# Patient Record
Sex: MALE | Race: OTHER | Employment: UNEMPLOYED | URBAN - METROPOLITAN AREA
[De-identification: names, ages, dates, MRNs, and addresses within clinical notes are randomized per-mention and may not be internally consistent; named-entity substitution may affect disease eponyms.]

---
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
## 2019-01-21 ENCOUNTER — APPOINTMENT (OUTPATIENT)
Dept: CT IMAGING | Facility: HOSPITAL | Age: 19
End: 2019-01-21
Attending: EMERGENCY MEDICINE
Payer: COMMERCIAL

## 2019-01-21 ENCOUNTER — HOSPITAL ENCOUNTER (EMERGENCY)
Facility: HOSPITAL | Age: 19
Discharge: HOME | End: 2019-01-21
Attending: EMERGENCY MEDICINE
Payer: COMMERCIAL

## 2019-01-21 VITALS
HEART RATE: 62 BPM | DIASTOLIC BLOOD PRESSURE: 62 MMHG | HEIGHT: 72 IN | TEMPERATURE: 97 F | BODY MASS INDEX: 37.52 KG/M2 | OXYGEN SATURATION: 98 % | RESPIRATION RATE: 16 BRPM | SYSTOLIC BLOOD PRESSURE: 96 MMHG | WEIGHT: 277 LBS

## 2019-01-21 DIAGNOSIS — G43.909 MIGRAINE WITHOUT STATUS MIGRAINOSUS, NOT INTRACTABLE, UNSPECIFIED MIGRAINE TYPE: Primary | ICD-10-CM

## 2019-01-21 LAB
ANION GAP SERPL CALC-SCNC: 6 MMOL/L (ref 0–18)
BASOPHILS # BLD AUTO: 0.07 X10(3) UL (ref 0–0.1)
BASOPHILS NFR BLD AUTO: 0.7 %
BUN BLD-MCNC: 13 MG/DL (ref 8–20)
BUN/CREAT SERPL: 12.4 (ref 10–20)
CALCIUM BLD-MCNC: 9.1 MG/DL (ref 8.3–10.3)
CHLORIDE SERPL-SCNC: 110 MMOL/L (ref 101–111)
CO2 SERPL-SCNC: 25 MMOL/L (ref 22–32)
CREAT BLD-MCNC: 1.05 MG/DL (ref 0.7–1.3)
EOSINOPHIL # BLD AUTO: 0.28 X10(3) UL (ref 0–0.3)
EOSINOPHIL NFR BLD AUTO: 2.8 %
ERYTHROCYTE [DISTWIDTH] IN BLOOD BY AUTOMATED COUNT: 14.1 % (ref 11.5–16)
GLUCOSE BLD-MCNC: 71 MG/DL (ref 70–99)
HCT VFR BLD AUTO: 43 % (ref 37–53)
HGB BLD-MCNC: 13.9 G/DL (ref 13–17)
IMMATURE GRANULOCYTE COUNT: 0.03 X10(3) UL (ref 0–1)
IMMATURE GRANULOCYTE RATIO %: 0.3 %
LYMPHOCYTES # BLD AUTO: 3.3 X10(3) UL (ref 0.9–4)
LYMPHOCYTES NFR BLD AUTO: 32.5 %
MCH RBC QN AUTO: 27.5 PG (ref 27–33.2)
MCHC RBC AUTO-ENTMCNC: 32.3 G/DL (ref 31–37)
MCV RBC AUTO: 85.1 FL (ref 80–99)
MONOCYTES # BLD AUTO: 0.66 X10(3) UL (ref 0.1–1)
MONOCYTES NFR BLD AUTO: 6.5 %
NEUTROPHIL ABS PRELIM: 5.81 X10 (3) UL (ref 1.3–6.7)
NEUTROPHILS # BLD AUTO: 5.81 X10(3) UL (ref 1.3–6.7)
NEUTROPHILS NFR BLD AUTO: 57.2 %
OSMOLALITY SERPL CALC.SUM OF ELEC: 291 MOSM/KG (ref 275–295)
PLATELET # BLD AUTO: 324 10(3)UL (ref 150–450)
POTASSIUM SERPL-SCNC: 3.9 MMOL/L (ref 3.6–5.1)
RBC # BLD AUTO: 5.05 X10(6)UL (ref 4.3–5.7)
RED CELL DISTRIBUTION WIDTH-SD: 43.6 FL (ref 35.1–46.3)
SODIUM SERPL-SCNC: 141 MMOL/L (ref 136–144)
WBC # BLD AUTO: 10.2 X10(3) UL (ref 4–13)

## 2019-01-21 PROCEDURE — 99284 EMERGENCY DEPT VISIT MOD MDM: CPT

## 2019-01-21 PROCEDURE — 80048 BASIC METABOLIC PNL TOTAL CA: CPT | Performed by: EMERGENCY MEDICINE

## 2019-01-21 PROCEDURE — 70450 CT HEAD/BRAIN W/O DYE: CPT | Performed by: EMERGENCY MEDICINE

## 2019-01-21 PROCEDURE — 85025 COMPLETE CBC W/AUTO DIFF WBC: CPT | Performed by: EMERGENCY MEDICINE

## 2019-01-21 RX ORDER — SUMATRIPTAN 50 MG/1
50 TABLET, FILM COATED ORAL EVERY 2 HOUR PRN
Qty: 6 TABLET | Refills: 0 | Status: SHIPPED | OUTPATIENT
Start: 2019-01-21 | End: 2019-02-20

## 2019-01-21 RX ORDER — DIPHENHYDRAMINE HYDROCHLORIDE 50 MG/ML
25 INJECTION INTRAMUSCULAR; INTRAVENOUS ONCE
Status: COMPLETED | OUTPATIENT
Start: 2019-01-21 | End: 2019-01-21

## 2019-01-21 RX ORDER — METOCLOPRAMIDE HYDROCHLORIDE 5 MG/ML
10 INJECTION INTRAMUSCULAR; INTRAVENOUS ONCE
Status: COMPLETED | OUTPATIENT
Start: 2019-01-21 | End: 2019-01-21

## 2019-01-21 RX ORDER — KETOROLAC TROMETHAMINE 30 MG/ML
30 INJECTION, SOLUTION INTRAMUSCULAR; INTRAVENOUS ONCE
Status: COMPLETED | OUTPATIENT
Start: 2019-01-21 | End: 2019-01-21

## 2019-01-21 NOTE — ED PROVIDER NOTES
Patient Seen in: [facility] Emergency Department    History   Patient presents with:  Headache (neurologic)    Stated Complaint: Migraine for days, no relief from meds    HPI    Should not presents with a migraine.   The patient states that he has no HPI.  Constitutional and vital signs reviewed. All other systems reviewed and negative except as noted above.     Physical Exam     ED Triage Vitals [01/21/19 1336]   /68   Pulse 80   Resp 14   Temp 97.3 °F (36.3 °C)   Temp src Temporal   SpO2 10 ([address]) which includes the Dose Index Registry. PATIENT STATED HISTORY: (As transcribed by Technologist)  migraine for days, with no relief from meds.  nausea and photophobia    FINDINGS:  VENTRICLES/SULCI:  Ventricles and sulci medications    SUMAtriptan Succinate 50 MG Oral Tab  Take 1 tablet (50 mg total) by mouth every 2 (two) hours as needed for Migraine. No more than 4 tablets/24 hours.   Qty: 6 tablet Refills: 0

## 2019-02-18 ENCOUNTER — APPOINTMENT (OUTPATIENT)
Dept: GENERAL RADIOLOGY | Facility: HOSPITAL | Age: 19
End: 2019-02-18
Attending: PEDIATRICS
Payer: OTHER MISCELLANEOUS

## 2019-02-18 ENCOUNTER — HOSPITAL ENCOUNTER (EMERGENCY)
Facility: HOSPITAL | Age: 19
Discharge: HOME | End: 2019-02-18
Attending: PEDIATRICS
Payer: OTHER MISCELLANEOUS

## 2019-02-18 VITALS
WEIGHT: 288.81 LBS | HEART RATE: 88 BPM | SYSTOLIC BLOOD PRESSURE: 115 MMHG | RESPIRATION RATE: 20 BRPM | BODY MASS INDEX: 39 KG/M2 | OXYGEN SATURATION: 97 % | TEMPERATURE: 98 F | DIASTOLIC BLOOD PRESSURE: 75 MMHG

## 2019-02-18 DIAGNOSIS — S43.402A SPRAIN OF LEFT SHOULDER, UNSPECIFIED SHOULDER SPRAIN TYPE, INITIAL ENCOUNTER: Primary | ICD-10-CM

## 2019-02-18 PROCEDURE — 99284 EMERGENCY DEPT VISIT MOD MDM: CPT | Performed by: PEDIATRICS

## 2019-02-18 PROCEDURE — 73000 X-RAY EXAM OF COLLAR BONE: CPT | Performed by: PEDIATRICS

## 2019-02-18 PROCEDURE — 73030 X-RAY EXAM OF SHOULDER: CPT | Performed by: PEDIATRICS

## 2019-02-18 RX ORDER — IBUPROFEN 600 MG/1
600 TABLET ORAL ONCE
Status: COMPLETED | OUTPATIENT
Start: 2019-02-18 | End: 2019-02-18

## 2019-02-18 NOTE — ED INITIAL ASSESSMENT (HPI)
Pt here for left shoulder pain  Per pt, \"I have lax joints and this morning I was at work at [employer] and I fell on the ice and landed on my left shoulder. I heard a popping noise. Now, it feels like it's not quite in place. \"  Pt report

## 2019-02-18 NOTE — ED PROVIDER NOTES
Patient Seen in: [facility] Emergency Department    History   Patient presents with:  Upper Extremity Injury (musculoskeletal)    Stated Complaint: fall /shoulder inj    HPI    20-year-old male who slipped while at work and landed on his left should oropharyngeal exudate. Eyes: Conjunctivae and EOM are normal. Pupils are equal, round, and reactive to light. Right eye exhibits no discharge. Left eye exhibits no discharge. No scleral icterus. Neck: Normal range of motion. Neck supple.  No JVD present BP: 115/75   Pulse: 88   Resp: 20   Temp: 98 °F (36.7 °C)   TempSrc: Temporal   SpO2: 97%   Weight: 131 kg           MDM   ASSESSMENT & PLAN:    22year old male with left shoulder sprain, x-rays negative for fracture. Sling given.     I have considered

## 2019-02-18 NOTE — ED NOTES
Spoke with Occupational Health regarding work injury. Pt with no paperwork brought with him. [facility] is not on list for drug or additional testing.

## 2019-02-19 ENCOUNTER — APPOINTMENT (OUTPATIENT)
Dept: OTHER | Facility: HOSPITAL | Age: 19
End: 2019-02-19
Attending: PREVENTIVE MEDICINE
Payer: OTHER MISCELLANEOUS

## 2019-02-22 ENCOUNTER — APPOINTMENT (OUTPATIENT)
Dept: OTHER | Facility: HOSPITAL | Age: 19
End: 2019-02-22
Attending: PREVENTIVE MEDICINE
Payer: OTHER MISCELLANEOUS

## 2019-03-01 ENCOUNTER — APPOINTMENT (OUTPATIENT)
Dept: OTHER | Facility: HOSPITAL | Age: 19
End: 2019-03-01
Attending: PREVENTIVE MEDICINE
Payer: OTHER MISCELLANEOUS

## 2019-03-01 ENCOUNTER — HOSPITAL ENCOUNTER (OUTPATIENT)
Dept: MRI IMAGING | Age: 19
Discharge: HOME | End: 2019-03-01
Attending: PREVENTIVE MEDICINE
Payer: OTHER MISCELLANEOUS

## 2019-03-01 DIAGNOSIS — M25.512 LEFT SHOULDER PAIN, UNSPECIFIED CHRONICITY: ICD-10-CM

## 2019-03-01 PROCEDURE — 73221 MRI JOINT UPR EXTREM W/O DYE: CPT | Performed by: PREVENTIVE MEDICINE

## 2019-03-06 PROBLEM — S43.422A SPRAIN OF LEFT ROTATOR CUFF CAPSULE, INITIAL ENCOUNTER: Status: ACTIVE | Noted: 2019-03-06

## 2019-05-06 ENCOUNTER — HOSPITAL ENCOUNTER (INPATIENT)
Facility: HOSPITAL | Age: 19
LOS: 3 days | Discharge: HOME | DRG: 603 | End: 2019-05-09
Attending: EMERGENCY MEDICINE | Admitting: HOSPITALIST
Payer: COMMERCIAL

## 2019-05-06 DIAGNOSIS — E87.6 HYPOKALEMIA: ICD-10-CM

## 2019-05-06 DIAGNOSIS — L03.311 ABDOMINAL WALL CELLULITIS: Primary | ICD-10-CM

## 2019-05-06 PROCEDURE — 99223 1ST HOSP IP/OBS HIGH 75: CPT | Performed by: HOSPITALIST

## 2019-05-06 RX ORDER — CEFAZOLIN SODIUM/WATER 2 G/20 ML
2 SYRINGE (ML) INTRAVENOUS ONCE
Status: COMPLETED | OUTPATIENT
Start: 2019-05-06 | End: 2019-05-06

## 2019-05-06 RX ORDER — KETOROLAC TROMETHAMINE 30 MG/ML
30 INJECTION, SOLUTION INTRAMUSCULAR; INTRAVENOUS EVERY 6 HOURS PRN
Status: DISPENSED | OUTPATIENT
Start: 2019-05-06 | End: 2019-05-08

## 2019-05-06 RX ORDER — CLONAZEPAM 0.5 MG/1
1 TABLET ORAL 2 TIMES DAILY PRN
Status: DISCONTINUED | OUTPATIENT
Start: 2019-05-06 | End: 2019-05-09

## 2019-05-06 RX ORDER — ACETAMINOPHEN 500 MG
1000 TABLET ORAL ONCE
Status: COMPLETED | OUTPATIENT
Start: 2019-05-06 | End: 2019-05-06

## 2019-05-06 RX ORDER — CEFAZOLIN SODIUM/WATER 2 G/20 ML
2 SYRINGE (ML) INTRAVENOUS EVERY 6 HOURS
Status: DISCONTINUED | OUTPATIENT
Start: 2019-05-06 | End: 2019-05-09

## 2019-05-06 RX ORDER — IPRATROPIUM BROMIDE AND ALBUTEROL SULFATE 2.5; .5 MG/3ML; MG/3ML
3 SOLUTION RESPIRATORY (INHALATION) EVERY 4 HOURS PRN
Status: DISCONTINUED | OUTPATIENT
Start: 2019-05-06 | End: 2019-05-09

## 2019-05-06 RX ORDER — SUMATRIPTAN 50 MG/1
50 TABLET, FILM COATED ORAL EVERY 2 HOUR PRN
Status: DISCONTINUED | OUTPATIENT
Start: 2019-05-06 | End: 2019-05-09

## 2019-05-06 RX ORDER — DEXTROAMPHETAMINE SACCHARATE, AMPHETAMINE ASPARTATE, DEXTROAMPHETAMINE SULFATE AND AMPHETAMINE SULFATE 2.5; 2.5; 2.5; 2.5 MG/1; MG/1; MG/1; MG/1
10 TABLET ORAL
Status: DISCONTINUED | OUTPATIENT
Start: 2019-05-07 | End: 2019-05-09

## 2019-05-06 RX ORDER — SUMATRIPTAN 50 MG/1
50 TABLET, FILM COATED ORAL EVERY 2 HOUR PRN
COMMUNITY
End: 2021-09-19

## 2019-05-06 RX ORDER — LAMOTRIGINE 25 MG/1
75 TABLET ORAL 2 TIMES DAILY
Status: DISCONTINUED | OUTPATIENT
Start: 2019-05-06 | End: 2019-05-09

## 2019-05-06 RX ORDER — IBUPROFEN 600 MG/1
600 TABLET ORAL EVERY 4 HOURS PRN
Status: DISCONTINUED | OUTPATIENT
Start: 2019-05-06 | End: 2019-05-09

## 2019-05-06 RX ORDER — QUETIAPINE 25 MG/1
75 TABLET, FILM COATED ORAL NIGHTLY
COMMUNITY
End: 2021-09-22 | Stop reason: DRUGHIGH

## 2019-05-06 RX ORDER — POTASSIUM CHLORIDE 20 MEQ/1
40 TABLET, EXTENDED RELEASE ORAL ONCE
Status: COMPLETED | OUTPATIENT
Start: 2019-05-06 | End: 2019-05-06

## 2019-05-06 RX ORDER — ONDANSETRON 2 MG/ML
4 INJECTION INTRAMUSCULAR; INTRAVENOUS EVERY 6 HOURS PRN
Status: DISCONTINUED | OUTPATIENT
Start: 2019-05-06 | End: 2019-05-09

## 2019-05-06 RX ORDER — DULOXETIN HYDROCHLORIDE 60 MG/1
120 CAPSULE, DELAYED RELEASE ORAL DAILY
Status: DISCONTINUED | OUTPATIENT
Start: 2019-05-07 | End: 2019-05-09

## 2019-05-06 RX ORDER — IBUPROFEN 400 MG/1
400 TABLET ORAL EVERY 4 HOURS PRN
Status: DISCONTINUED | OUTPATIENT
Start: 2019-05-06 | End: 2019-05-09

## 2019-05-06 RX ORDER — BUPROPION HYDROCHLORIDE 150 MG/1
450 TABLET ORAL DAILY
Status: DISCONTINUED | OUTPATIENT
Start: 2019-05-07 | End: 2019-05-09

## 2019-05-06 RX ORDER — TESTOSTERONE ENANTHATE 200 MG/ML
200 INJECTION, SOLUTION INTRAMUSCULAR WEEKLY
Refills: 1 | COMMUNITY
Start: 2019-03-07 | End: 2021-09-22

## 2019-05-06 RX ORDER — METOCLOPRAMIDE HYDROCHLORIDE 5 MG/ML
10 INJECTION INTRAMUSCULAR; INTRAVENOUS EVERY 8 HOURS PRN
Status: DISCONTINUED | OUTPATIENT
Start: 2019-05-06 | End: 2019-05-09

## 2019-05-06 RX ORDER — IBUPROFEN 200 MG
200 TABLET ORAL EVERY 4 HOURS PRN
Status: DISCONTINUED | OUTPATIENT
Start: 2019-05-06 | End: 2019-05-09

## 2019-05-06 RX ORDER — GABAPENTIN 300 MG/1
300 CAPSULE ORAL NIGHTLY
Status: DISCONTINUED | OUTPATIENT
Start: 2019-05-06 | End: 2019-05-09

## 2019-05-06 RX ORDER — ACETAMINOPHEN 325 MG/1
650 TABLET ORAL EVERY 6 HOURS PRN
Status: DISCONTINUED | OUTPATIENT
Start: 2019-05-06 | End: 2019-05-09

## 2019-05-06 RX ORDER — TOPIRAMATE 25 MG/1
50 TABLET ORAL 2 TIMES DAILY
Status: DISCONTINUED | OUTPATIENT
Start: 2019-05-06 | End: 2019-05-09

## 2019-05-06 NOTE — ED PROVIDER NOTES
Patient Seen in: [facility] Emergency Department    History   Patient presents with:  Fever (infectious)  Cellulitis (integumentary, infectious)    Stated Complaint: fever, bodyaches, redness to abdomen    HPI    CHIEF COMPLAINT: Fevers, abdominal r above.     Past Medical History:   Diagnosis Date   • ADD (attention deficit disorder)    • ALLERGIC RHINITIS    • Anxiety    • ASTHMA    • Asthma    • Blastomycosis    • Cancer [facility])    • Chronic rhinitis    • Depression    • Extrinsic asthma, unspecified abdominal distention. Neurological:  Grossly intact, no deficits. 5 out of 5 symmetric upper and lower extremity motor strength. Gait normal.  Skin:  warm and dry, no rashes. No jaundice.  Brisk capillary refill  Musculoskeletal: neck is supple, no lympha results with the patient. I discussed the diagnosis and admission for further evaluation and care. Patient states they understand diagnosis and admission and agree with admission and plan. I answered all of the patient's questions prior to admission.   Admi

## 2019-05-06 NOTE — PROGRESS NOTES
NURSING ADMISSION NOTE      Patient admitted via Cart accompanied per MOM  tmep--100.9;rashes noted entire abdomen;alert and oriented x 4;IV saline loc to right ac  Oriented to room. Safety precautions initiated. Bed in low position.   Call light in r

## 2019-05-07 PROCEDURE — 99232 SBSQ HOSP IP/OBS MODERATE 35: CPT | Performed by: HOSPITALIST

## 2019-05-07 NOTE — PROGRESS NOTES
[facility]  Progress Note     Gerard Villarreal Patient Status:  Inpatient    2000 MRN [id number]   [facility] 4NW-A Attending Warren Christensen MD   Hosp Day # 1 PCP Warden Richardson     Chief Complaint: abdominal pain    S: Imaging data reviewed in Epic.     Medications:   • buPROPion HCl ER (XL)  450 mg Oral Daily   • DULoxetine HCl  120 mg Oral Daily   • gabapentin  300 mg Oral Nightly   • lamoTRIgine  75 mg Oral BID   • amphetamine-dextroamphetamine  10 mg Oral BID AC   • F

## 2019-05-07 NOTE — PAYOR COMM NOTE
--------------  ADMISSION REVIEW     Payor: Nataliia Space #:  [id number]  Authorization Number: [id number]    Admit date: 5/6/19  Admit time: 2728       Admitting Physician: Lachelle Garcia MD  Attending Physician:  Lachelle Garcia MD  Kasaan Pole movement changes. Patient's appetite has been decreased for the past 24 hours patient denies any recent surgeries. João Lo       REVIEW OF SYSTEMS:  Eyes: no discharge  ENT: no sore throat  Cardiovascular: no chest pain, no palpitations  Respiratory: no cough, no SpO2 98%   BMI 37.57 kg/m²          Physical Exam    Vital signs and nursing notes reviewed   General Appearance: Patient is alert and oriented x4 in no acute distress  Eyes: pupils equal and round no pallor or injection, no sclera icterus  Respiratory: ------                     CBC W/ DIFFERENTIAL[[id number]]          Abnormal            Final result                 Please view results for these tests on the individual orders.    URINALYSIS WITH CULTURE REFLEX   BLOOD CULTURE   BLOOD CULTURE   patient IV diagnosis)  Hypokalemia. I agree with the plan as noted.               Signed by Soraida Armijo MD on 5/6/2019 10:26 PM            H&P - H&P Note      H&P signed by Paddy Lang MD at 5/6/2019  4:55 PM     Author:  Paddy Lang MD Service:  Cristiano Bajwa Past Surgical History: History reviewed. No pertinent surgical history. Social History:  reports that he has never smoked. He has never used smokeless tobacco. He reports that he does not drink alcohol or use drugs.     Family History:   Family Histo ) Disp: 30 capsule Rfl: 0   Lisdexamfetamine Dimesylate 50 MG Oral Cap Take 1 capsule (50 mg total) by mouth every morning.  Disp: 15 capsule Rfl: 0   ClonazePAM (KLONOPIN) 0.5 MG Oral Tab Take one tab BID (Patient taking differently: Take 1.5 mg by mouth 2 Lab 05/06/19  1450   GLU 92   BUN 11   CREATSERUM 1.09   GFRAA 112   GFRNAA 97   CA 9.7   ALB 3.9      K 3.3*      CO2 23.0   ALKPHO 126*   AST 17   ALT 26   BILT 0.6   TP 8.3*       Estimated Creatinine Clearance: 117.7 mL/min (based on SCr Intravenous Arnoldo Ayon RN    5/6/2019 2107 Given 2 g Intravenous Arnoldo Ayon RN      DULoxetine HCl (CYMBALTA) DR particles cap 120 mg     Date Action Dose Route User    5/7/2019 5360 Given 120 mg Oral Ermalene CAROL ANN Alfredo      gabapentin (Susanne Mjt 119/71  HEENT: Moist mucous membranes. Extraocular muscles are intact. Neck: No lymphadenopathy. supple, no masses  Respiratory: Clear to auscultation bilaterally. No wheezes. No rhonchi. Cardiovascular: S1, S2.  Regular rate and rhythm. No murmurs.   Ab

## 2019-05-07 NOTE — PLAN OF CARE
Patient received this am alert and oriented, resting in bed, mom at bedside. Lungs clear on room air, abdomen soft, bowel sounds present, denies any issues with urinating. Complaints of pain this am and IV toradol administered per MAR.  Saline lock flushed

## 2019-05-07 NOTE — PLAN OF CARE
Pt alert and oriented x4. VSS, afebrile. Consult to ID called, will see pt in morning. Denies pain, c/o generalized discomfort to abd area. Area of abdomen with cellulitis marked per orders. Abdomen red and warm to touch. IV abx given per MAR.  Denies nause

## 2019-05-07 NOTE — CONSULTS
INFECTIOUS DISEASE CONSULTATION    Naomi Sales Patient Status:  Inpatient    2000 MRN [id number]   [facility] 4NW-A Attending Nelida White MD   [facility] Day # 1 PCP Kalina Viera Oral, Daily  •  clonazePAM (KLONOPIN) tab 1 mg, 1 mg, Oral, BID PRN  •  DULoxetine HCl (CYMBALTA) DR particles cap 120 mg, 120 mg, Oral, Daily  •  gabapentin (NEURONTIN) cap 300 mg, 300 mg, Oral, Nightly  •  lamoTRIgine (LAMICTAL) tab 75 mg, 75 mg, Oral, B daily. Disp: 90 tablet Rfl: 0   topiramate 50 MG Oral Tab Take 1 tablet (50 mg total) by mouth 2 (two) times daily. Disp: 30 tablet Rfl: 0   BuPROPion HCl ER, SR, 150 MG Oral Tablet 12 Hr Take 1 tablet (150 mg total) by mouth 2 (two) times daily.  (Patient and rhythm. No murmurs. Abdomen: Soft, nontender, nondistended. Positive bowel sounds. Musculoskeletal: Full range of motion of all extremities. No swelling noted.   Joints: no effusions  Skin: Bilateral mastectomy scars healed  Prominent abdominal str

## 2019-05-08 PROBLEM — F32.A DEPRESSION: Chronic | Status: ACTIVE | Noted: 2019-05-08

## 2019-05-08 PROCEDURE — 99232 SBSQ HOSP IP/OBS MODERATE 35: CPT | Performed by: INTERNAL MEDICINE

## 2019-05-08 RX ORDER — POTASSIUM CHLORIDE 20 MEQ/1
40 TABLET, EXTENDED RELEASE ORAL EVERY 4 HOURS
Status: DISPENSED | OUTPATIENT
Start: 2019-05-08 | End: 2019-05-08

## 2019-05-08 NOTE — PROGRESS NOTES
[facility]                INFECTIOUS DISEASE PROGRESS NOTE    Will Flores Patient Status:  Inpatient    2000 MRN [id number]   [facility] 4NW-A Attending Mary Camacho MD   [facility] Day # 2 PCP Liang Birch     Anti Generalized anxiety disorder     Suicide risk     ADHD (attention deficit hyperactivity disorder), inattentive type     Sprain of calcaneofibular (ligament) of ankle     Chondromalacia of patella     Pain in joint, lower leg     Anxiety disorder     Anaphy

## 2019-05-08 NOTE — PLAN OF CARE
Pt Aox4 with complaints of mild pain to left thoracic region and left axilla. Redness extending beyond previously marked areas of redness. Dr. Le Estimable notified and came to examine pt at [phone].  New area marked and RN instructed to check hourly for the next

## 2019-05-08 NOTE — PLAN OF CARE
Patient received this am alert and oriented, resting in bed, lungs clear, abdomen soft, bowel sounds present, passing flatus. Voiding adequate amounts of urine, asymptomatic. Saline lock flushed and patent.   Problem: PAIN - ADULT  Goal: Verbalizes/displays

## 2019-05-08 NOTE — PROGRESS NOTES
[facility]  Progress Note     Will Flores Patient Status:  Inpatient    2000 MRN [id number]   [facility] 4NW-A Attending Shree Gleason MD   Hosp Day # 2 PCP Liang Birch     Chief Complaint: abdominal pain    S: 13.2   MCV 84.9 86.4 87.6   .0 219.0 225.0       Recent Labs   Lab 05/06/19  1450 05/08/19  0549   GLU 92 115*   BUN 11 12   CREATSERUM 1.09 1.21   GFRAA 112 98   GFRNAA 97 85   CA 9.7 9.0   ALB 3.9  --     142   K 3.3* 3.6    110   CO2

## 2019-05-09 VITALS
HEART RATE: 67 BPM | RESPIRATION RATE: 18 BRPM | HEIGHT: 72 IN | DIASTOLIC BLOOD PRESSURE: 69 MMHG | SYSTOLIC BLOOD PRESSURE: 119 MMHG | WEIGHT: 277 LBS | TEMPERATURE: 98 F | BODY MASS INDEX: 37.52 KG/M2 | OXYGEN SATURATION: 94 %

## 2019-05-09 PROCEDURE — 99239 HOSP IP/OBS DSCHRG MGMT >30: CPT | Performed by: INTERNAL MEDICINE

## 2019-05-09 RX ORDER — PENICILLIN V POTASSIUM 500 MG/1
TABLET ORAL
Qty: 120 TABLET | Refills: 1 | Status: SHIPPED | OUTPATIENT
Start: 2019-05-09 | End: 2019-07-18

## 2019-05-09 NOTE — PROGRESS NOTES
[facility]                INFECTIOUS DISEASE PROGRESS NOTE    Vijaya Foster Patient Status:  Inpatient    2000 MRN [id number]   [facility] 4NW-A Attending Caitlyn Clemente MD   [facility] Day # 3 KIRSTEN Roldan hyperactivity disorder), inattentive type     Sprain of calcaneofibular (ligament) of ankle     Chondromalacia of patella     Pain in joint, lower leg     Anxiety disorder     Anaphylactic reaction     Allergic reaction     Anaphylaxis     Migraine     Int

## 2019-05-09 NOTE — PLAN OF CARE
Pt Aox4 with no complaints of pain. Pt states that pain is only present with pressure to the affected area. Redness on abd has decreased in the past 24 hours. Redness and swelling in left axilla is the same. Pt given meds per MAR.  Pt IV site wrapped and co

## 2019-05-09 NOTE — DISCHARGE SUMMARY
[facility]  Discharge Summary    Tereza Jones Patient Status:  Inpatient    2000 MRN [id number]   [facility] 4NW-A Attending Nuvia Juarez MD   [facility] Day # 3 KIRSTEN Blake     Date of Admission: 2019    Date of TCM follow-up    Lace+ Score: 63  59-90 High Risk  29-58 Medium Risk  0-28   Low Risk. TCM Follow-Up Recommendation:Mendel Hermosillo   LACE > 58:  High Risk of readmission after discharge from the hospital.      PCP: Yulissa Nunn Take 1 capsule (60 mg total) by mouth 2 (two) times daily.    Quantity:  30 capsule  Refills:  0        CONTINUE taking these medications      Instructions Prescription details   ASMANEX 30 METERED DOSES 110 MCG/INH Aepb  Generic drug:  Mometasone Furoate Windy Naqvi MD  [address] [place] [address]  [phone]      ID, As needed    Appointments for Next 30 Days 5/9/2019 - 6/8/2019    None            Physical Exam:  /69 (BP Location: Right arm)   Pulse 67   Temp 98.4 °F (36.9 °C)

## 2019-05-09 NOTE — PROGRESS NOTES
[facility]  Progress Note     Yudi Mahre Patient Status:  Inpatient    2000 MRN [id number]   [facility] 4NW-A Attending Susan Winchester MD   Hosp Day # 3 PCP Boy Stratton     Chief Complaint: abdominal pain    S: 246.0 219.0 225.0       Recent Labs   Lab 05/06/19  1450 05/08/19  0549   GLU 92 115*   BUN 11 12   CREATSERUM 1.09 1.21   GFRAA 112 98   GFRNAA 97 85   CA 9.7 9.0   ALB 3.9  --     142   K 3.3* 3.6    110   CO2 23.0 22.0   ALKPHO 126*  --    A

## 2019-05-09 NOTE — PROGRESS NOTES
Patient discharged to home explained all medications and follow up appt. Also explained to  medication at pharmacy. Patient verbalized his understanding of teaching.

## 2019-05-10 NOTE — PAYOR COMM NOTE
--------------  ADMISSION REVIEW     Payor: Nile Bellecal #:  [id number]  Authorization Number: [id number]    Admit date: 5/6/19  Admit time: 9257       Admitting Physician: Maggy Ramsay MD  Attending Physician:  No att. providers found movement changes. Patient's appetite has been decreased for the past 24 hours patient denies any recent surgeries. Gabriela Band       REVIEW OF SYSTEMS:  Eyes: no discharge  ENT: no sore throat  Cardiovascular: no chest pain, no palpitations  Respiratory: no cough, no SpO2 98%   BMI 37.57 kg/m²          Physical Exam    Vital signs and nursing notes reviewed   General Appearance: Patient is alert and oriented x4 in no acute distress  Eyes: pupils equal and round no pallor or injection, no sclera icterus  Respiratory: ------                     CBC W/ DIFFERENTIAL[[id number]]          Abnormal            Final result                 Please view results for these tests on the individual orders.    URINALYSIS WITH CULTURE REFLEX   BLOOD CULTURE   BLOOD CULTURE   patient IV diagnosis)  Hypokalemia. I agree with the plan as noted.               Signed by Royce Smith MD on 5/6/2019 10:26 PM            H&P - H&P Note      H&P signed by Merna Palmer MD at 5/6/2019  4:55 PM     Author:  Merna Palmer MD Service:  Ed Melton Past Surgical History: History reviewed. No pertinent surgical history. Social History:  reports that he has never smoked. He has never used smokeless tobacco. He reports that he does not drink alcohol or use drugs.     Family History:   Family Histo ) Disp: 30 capsule Rfl: 0   Lisdexamfetamine Dimesylate 50 MG Oral Cap Take 1 capsule (50 mg total) by mouth every morning.  Disp: 15 capsule Rfl: 0   ClonazePAM (KLONOPIN) 0.5 MG Oral Tab Take one tab BID (Patient taking differently: Take 1.5 mg by mouth 2 Lab 05/06/19  1450   GLU 92   BUN 11   CREATSERUM 1.09   GFRAA 112   GFRNAA 97   CA 9.7   ALB 3.9      K 3.3*      CO2 23.0   ALKPHO 126*   AST 17   ALT 26   BILT 0.6   TP 8.3*       Estimated Creatinine Clearance: 117.7 mL/min (based on SCr

## 2020-11-21 ENCOUNTER — HOSPITAL ENCOUNTER (EMERGENCY)
Facility: HOSPITAL | Age: 20
Discharge: HOME | End: 2020-11-21
Attending: EMERGENCY MEDICINE
Payer: OTHER MISCELLANEOUS

## 2020-11-21 VITALS
OXYGEN SATURATION: 97 % | HEART RATE: 81 BPM | TEMPERATURE: 98 F | DIASTOLIC BLOOD PRESSURE: 78 MMHG | WEIGHT: 315 LBS | RESPIRATION RATE: 16 BRPM | BODY MASS INDEX: 43 KG/M2 | SYSTOLIC BLOOD PRESSURE: 124 MMHG

## 2020-11-21 DIAGNOSIS — T07.XXXA MULTIPLE ABRASIONS: ICD-10-CM

## 2020-11-21 DIAGNOSIS — S61.552A DOG BITE OF LEFT WRIST, INITIAL ENCOUNTER: Primary | ICD-10-CM
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
## 2020-11-21 PROCEDURE — 99283 EMERGENCY DEPT VISIT LOW MDM: CPT

## 2020-11-21 PROCEDURE — 90471 IMMUNIZATION ADMIN: CPT

## 2020-11-21 RX ORDER — AMOXICILLIN AND CLAVULANATE POTASSIUM 875; 125 MG/1; MG/1
1 TABLET, FILM COATED ORAL 2 TIMES DAILY
Qty: 20 TABLET | Refills: 0 | Status: SHIPPED | OUTPATIENT
Start: 2020-11-21 | End: 2020-12-01

## 2020-11-21 NOTE — ED PROVIDER NOTES
Patient Seen in: [facility] Emergency Department      History   Patient presents with:  Bite    Stated Complaint: DOG BITE    HPI    23 yr old M here with dog bite to left wrist. 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  He s normal.      Mouth/Throat:      Mouth: Mucous membranes are moist.   Eyes:      Extraocular Movements: Extraocular movements intact. Conjunctiva/sclera: Conjunctivae normal.      Pupils: Pupils are equal, round, and reactive to light.    Neck:      Mus abrasions    Disposition:  Discharge  11/21/2020  8:19 am    Follow-up:  Christine Anderson 95  [facility] 93  [address]  [phone]  Call in 2 day(s)  Work related injury follow up          Medications Presc

## 2020-11-21 NOTE — ED INITIAL ASSESSMENT (HPI)
Pt presents to ed from work where a dog bite his left hand, small laceration noted to left hand with bleeding controlled

## 2021-09-15 ENCOUNTER — APPOINTMENT (OUTPATIENT)
Dept: GENERAL RADIOLOGY | Facility: HOSPITAL | Age: 21
DRG: 605 | End: 2021-09-15
Attending: EMERGENCY MEDICINE
Payer: COMMERCIAL

## 2021-09-15 ENCOUNTER — HOSPITAL ENCOUNTER (INPATIENT)
Facility: HOSPITAL | Age: 21
LOS: 2 days | Discharge: HOME | DRG: 605 | End: 2021-09-19
Attending: EMERGENCY MEDICINE | Admitting: HOSPITALIST
Payer: COMMERCIAL

## 2021-09-15 DIAGNOSIS — S21.101A: Primary | ICD-10-CM

## 2021-09-15 DIAGNOSIS — F42.4 EXCORIATION (SKIN-PICKING) DISORDER: ICD-10-CM

## 2021-09-15 DIAGNOSIS — L03.313 CELLULITIS OF CHEST WALL: ICD-10-CM

## 2021-09-15 LAB
ALBUMIN SERPL-MCNC: 3.5 G/DL (ref 3.4–5)
ALBUMIN/GLOB SERPL: 0.7 {RATIO} (ref 1–2)
ALP LIVER SERPL-CCNC: 128 U/L
ALT SERPL-CCNC: 19 U/L
ANION GAP SERPL CALC-SCNC: 8 MMOL/L (ref 0–18)
AST SERPL-CCNC: 13 U/L (ref 15–37)
BASOPHILS # BLD AUTO: 0.07 X10(3) UL (ref 0–0.2)
BASOPHILS NFR BLD AUTO: 0.6 %
BILIRUB SERPL-MCNC: 0.4 MG/DL (ref 0.1–2)
BUN BLD-MCNC: 10 MG/DL (ref 7–18)
CALCIUM BLD-MCNC: 8.9 MG/DL (ref 8.5–10.1)
CHLORIDE SERPL-SCNC: 112 MMOL/L (ref 98–112)
CO2 SERPL-SCNC: 20 MMOL/L (ref 21–32)
CREAT BLD-MCNC: 1.1 MG/DL
EOSINOPHIL # BLD AUTO: 0.46 X10(3) UL (ref 0–0.7)
EOSINOPHIL NFR BLD AUTO: 4.2 %
ERYTHROCYTE [DISTWIDTH] IN BLOOD BY AUTOMATED COUNT: 12.7 %
GLOBULIN PLAS-MCNC: 4.7 G/DL (ref 2.8–4.4)
GLUCOSE BLD-MCNC: 116 MG/DL (ref 70–99)
HCT VFR BLD AUTO: 43.4 %
HGB BLD-MCNC: 14.5 G/DL
IMM GRANULOCYTES # BLD AUTO: 0.04 X10(3) UL (ref 0–1)
IMM GRANULOCYTES NFR BLD: 0.4 %
LACTATE SERPL-SCNC: 1.3 MMOL/L (ref 0.4–2)
LYMPHOCYTES # BLD AUTO: 2.53 X10(3) UL (ref 1–4)
LYMPHOCYTES NFR BLD AUTO: 23.3 %
MCH RBC QN AUTO: 29.5 PG (ref 26–34)
MCHC RBC AUTO-ENTMCNC: 33.4 G/DL (ref 31–37)
MCV RBC AUTO: 88.4 FL
MONOCYTES # BLD AUTO: 0.78 X10(3) UL (ref 0.1–1)
MONOCYTES NFR BLD AUTO: 7.2 %
NEUTROPHILS # BLD AUTO: 6.97 X10 (3) UL (ref 1.5–7.7)
NEUTROPHILS # BLD AUTO: 6.97 X10(3) UL (ref 1.5–7.7)
NEUTROPHILS NFR BLD AUTO: 64.3 %
OSMOLALITY SERPL CALC.SUM OF ELEC: 290 MOSM/KG (ref 275–295)
PLATELET # BLD AUTO: 335 10(3)UL (ref 150–450)
POTASSIUM SERPL-SCNC: 3.7 MMOL/L (ref 3.5–5.1)
PROT SERPL-MCNC: 8.2 G/DL (ref 6.4–8.2)
RBC # BLD AUTO: 4.91 X10(6)UL
SARS-COV-2 RNA RESP QL NAA+PROBE: NOT DETECTED
SODIUM SERPL-SCNC: 140 MMOL/L (ref 136–145)
WBC # BLD AUTO: 10.9 X10(3) UL (ref 4–11)

## 2021-09-15 PROCEDURE — 71045 X-RAY EXAM CHEST 1 VIEW: CPT | Performed by: EMERGENCY MEDICINE

## 2021-09-15 PROCEDURE — 99223 1ST HOSP IP/OBS HIGH 75: CPT | Performed by: INTERNAL MEDICINE

## 2021-09-15 RX ORDER — CEFAZOLIN SODIUM/WATER 2 G/20 ML
2 SYRINGE (ML) INTRAVENOUS ONCE
Status: COMPLETED | OUTPATIENT
Start: 2021-09-15 | End: 2021-09-15

## 2021-09-15 RX ORDER — MORPHINE SULFATE 4 MG/ML
4 INJECTION, SOLUTION INTRAMUSCULAR; INTRAVENOUS ONCE
Status: COMPLETED | OUTPATIENT
Start: 2021-09-15 | End: 2021-09-15

## 2021-09-16 ENCOUNTER — APPOINTMENT (OUTPATIENT)
Dept: CT IMAGING | Facility: HOSPITAL | Age: 21
DRG: 605 | End: 2021-09-16
Attending: INTERNAL MEDICINE
Payer: COMMERCIAL

## 2021-09-16 PROBLEM — F42.9 OBSESSIVE-COMPULSIVE DISORDER: Status: ACTIVE | Noted: 2017-02-24

## 2021-09-16 PROBLEM — L03.313 CELLULITIS OF CHEST WALL: Status: ACTIVE | Noted: 2021-09-16

## 2021-09-16 PROBLEM — S21.101A: Status: ACTIVE | Noted: 2021-09-16

## 2021-09-16 PROCEDURE — 71250 CT THORAX DX C-: CPT | Performed by: INTERNAL MEDICINE

## 2021-09-16 PROCEDURE — 99232 SBSQ HOSP IP/OBS MODERATE 35: CPT | Performed by: HOSPITALIST

## 2021-09-16 PROCEDURE — 99243 OFF/OP CNSLTJ NEW/EST LOW 30: CPT | Performed by: STUDENT IN AN ORGANIZED HEALTH CARE EDUCATION/TRAINING PROGRAM

## 2021-09-16 PROCEDURE — 90792 PSYCH DIAG EVAL W/MED SRVCS: CPT | Performed by: OTHER

## 2021-09-16 RX ORDER — CLONAZEPAM 0.5 MG/1
1.5 TABLET ORAL 2 TIMES DAILY
Status: DISCONTINUED | OUTPATIENT
Start: 2021-09-16 | End: 2021-09-19

## 2021-09-16 RX ORDER — BUPROPION HYDROCHLORIDE 150 MG/1
450 TABLET, EXTENDED RELEASE ORAL DAILY
Status: DISCONTINUED | OUTPATIENT
Start: 2021-09-16 | End: 2021-09-19

## 2021-09-16 RX ORDER — SODIUM PHOSPHATE, DIBASIC AND SODIUM PHOSPHATE, MONOBASIC 7; 19 G/133ML; G/133ML
1 ENEMA RECTAL ONCE AS NEEDED
Status: DISCONTINUED | OUTPATIENT
Start: 2021-09-16 | End: 2021-09-19

## 2021-09-16 RX ORDER — VANCOMYCIN 2 GRAM/500 ML IN 0.9 % SODIUM CHLORIDE INTRAVENOUS
25 ONCE
Status: COMPLETED | OUTPATIENT
Start: 2021-09-16 | End: 2021-09-16

## 2021-09-16 RX ORDER — BUPROPION HYDROCHLORIDE 150 MG/1
150 TABLET, EXTENDED RELEASE ORAL EVERY EVENING
Status: DISCONTINUED | OUTPATIENT
Start: 2021-09-16 | End: 2021-09-16

## 2021-09-16 RX ORDER — MELATONIN
3 NIGHTLY PRN
Status: DISCONTINUED | OUTPATIENT
Start: 2021-09-16 | End: 2021-09-19

## 2021-09-16 RX ORDER — PROCHLORPERAZINE EDISYLATE 5 MG/ML
5 INJECTION INTRAMUSCULAR; INTRAVENOUS EVERY 8 HOURS PRN
Status: DISCONTINUED | OUTPATIENT
Start: 2021-09-16 | End: 2021-09-19

## 2021-09-16 RX ORDER — ENOXAPARIN SODIUM 100 MG/ML
40 INJECTION SUBCUTANEOUS DAILY
Status: DISCONTINUED | OUTPATIENT
Start: 2021-09-16 | End: 2021-09-19

## 2021-09-16 RX ORDER — MORPHINE SULFATE 2 MG/ML
2 INJECTION, SOLUTION INTRAMUSCULAR; INTRAVENOUS EVERY 2 HOUR PRN
Status: DISCONTINUED | OUTPATIENT
Start: 2021-09-16 | End: 2021-09-19

## 2021-09-16 RX ORDER — GUANFACINE 1 MG/1
1 TABLET ORAL NIGHTLY
COMMUNITY
End: 2021-09-22

## 2021-09-16 RX ORDER — MORPHINE SULFATE 2 MG/ML
1 INJECTION, SOLUTION INTRAMUSCULAR; INTRAVENOUS EVERY 2 HOUR PRN
Status: DISCONTINUED | OUTPATIENT
Start: 2021-09-16 | End: 2021-09-19

## 2021-09-16 RX ORDER — GUANFACINE 1 MG/1
1 TABLET ORAL NIGHTLY
Status: DISCONTINUED | OUTPATIENT
Start: 2021-09-16 | End: 2021-09-19

## 2021-09-16 RX ORDER — ONDANSETRON 2 MG/ML
4 INJECTION INTRAMUSCULAR; INTRAVENOUS EVERY 6 HOURS PRN
Status: DISCONTINUED | OUTPATIENT
Start: 2021-09-16 | End: 2021-09-19

## 2021-09-16 RX ORDER — VANCOMYCIN/0.9 % SOD CHLORIDE 1.75 G/5
15 PLASTIC BAG, INJECTION (ML) INTRAVENOUS EVERY 12 HOURS
Status: DISCONTINUED | OUTPATIENT
Start: 2021-09-16 | End: 2021-09-18

## 2021-09-16 RX ORDER — DULOXETIN HYDROCHLORIDE 60 MG/1
120 CAPSULE, DELAYED RELEASE ORAL 2 TIMES DAILY
Status: DISCONTINUED | OUTPATIENT
Start: 2021-09-16 | End: 2021-09-19

## 2021-09-16 RX ORDER — BISACODYL 10 MG
10 SUPPOSITORY, RECTAL RECTAL
Status: DISCONTINUED | OUTPATIENT
Start: 2021-09-16 | End: 2021-09-19

## 2021-09-16 RX ORDER — QUETIAPINE 25 MG/1
75 TABLET, FILM COATED ORAL NIGHTLY
Status: DISCONTINUED | OUTPATIENT
Start: 2021-09-16 | End: 2021-09-19

## 2021-09-16 RX ORDER — DOCUSATE SODIUM 100 MG/1
100 CAPSULE, LIQUID FILLED ORAL 2 TIMES DAILY PRN
Status: DISCONTINUED | OUTPATIENT
Start: 2021-09-16 | End: 2021-09-19

## 2021-09-16 RX ORDER — HYDROCODONE BITARTRATE AND ACETAMINOPHEN 5; 325 MG/1; MG/1
1 TABLET ORAL EVERY 4 HOURS PRN
Status: DISCONTINUED | OUTPATIENT
Start: 2021-09-16 | End: 2021-09-19

## 2021-09-16 RX ORDER — POLYETHYLENE GLYCOL 3350 17 G/17G
17 POWDER, FOR SOLUTION ORAL DAILY PRN
Status: DISCONTINUED | OUTPATIENT
Start: 2021-09-16 | End: 2021-09-19

## 2021-09-16 RX ORDER — MORPHINE SULFATE 4 MG/ML
4 INJECTION, SOLUTION INTRAMUSCULAR; INTRAVENOUS EVERY 2 HOUR PRN
Status: DISCONTINUED | OUTPATIENT
Start: 2021-09-16 | End: 2021-09-19

## 2021-09-16 RX ORDER — DEXTROAMPHETAMINE SACCHARATE, AMPHETAMINE ASPARTATE, DEXTROAMPHETAMINE SULFATE AND AMPHETAMINE SULFATE 2.5; 2.5; 2.5; 2.5 MG/1; MG/1; MG/1; MG/1
10 TABLET ORAL
Status: DISCONTINUED | OUTPATIENT
Start: 2021-09-16 | End: 2021-09-19

## 2021-09-16 RX ORDER — HYDROCODONE BITARTRATE AND ACETAMINOPHEN 5; 325 MG/1; MG/1
2 TABLET ORAL EVERY 4 HOURS PRN
Status: DISCONTINUED | OUTPATIENT
Start: 2021-09-16 | End: 2021-09-19

## 2021-09-16 RX ORDER — TOPIRAMATE 25 MG/1
50 TABLET ORAL 2 TIMES DAILY
Status: DISCONTINUED | OUTPATIENT
Start: 2021-09-16 | End: 2021-09-19

## 2021-09-16 RX ORDER — GABAPENTIN 300 MG/1
300 CAPSULE ORAL NIGHTLY
Status: DISCONTINUED | OUTPATIENT
Start: 2021-09-16 | End: 2021-09-19

## 2021-09-16 RX ORDER — PANTOPRAZOLE SODIUM 40 MG/1
40 TABLET, DELAYED RELEASE ORAL
Status: DISCONTINUED | OUTPATIENT
Start: 2021-09-16 | End: 2021-09-19

## 2021-09-16 RX ORDER — ALBUTEROL SULFATE 90 UG/1
2 AEROSOL, METERED RESPIRATORY (INHALATION) EVERY 4 HOURS PRN
Status: DISCONTINUED | OUTPATIENT
Start: 2021-09-16 | End: 2021-09-19

## 2021-09-16 RX ORDER — KETOROLAC TROMETHAMINE 15 MG/ML
15 INJECTION, SOLUTION INTRAMUSCULAR; INTRAVENOUS EVERY 6 HOURS PRN
Status: DISPENSED | OUTPATIENT
Start: 2021-09-16 | End: 2021-09-18

## 2021-09-16 RX ORDER — LAMOTRIGINE 100 MG/1
100 TABLET ORAL 2 TIMES DAILY
Status: DISCONTINUED | OUTPATIENT
Start: 2021-09-16 | End: 2021-09-19

## 2021-09-16 RX ORDER — ACETAMINOPHEN 325 MG/1
650 TABLET ORAL EVERY 4 HOURS PRN
Status: DISCONTINUED | OUTPATIENT
Start: 2021-09-16 | End: 2021-09-19

## 2021-09-16 RX ORDER — PROPRANOLOL HYDROCHLORIDE 10 MG/1
10 TABLET ORAL 2 TIMES DAILY
Status: DISCONTINUED | OUTPATIENT
Start: 2021-09-16 | End: 2021-09-19

## 2021-09-16 NOTE — CONSULTS
[facility]  Report of Inpatient Wound Care Consultation    Donna Hill Patient Status:  Observation    2000 MRN [id number]   [facility] 3NE-A Attending Gisell Sebastian MD   Hosp Day # 0 PCP Phan Way MD     Reason for C 7459   Present on Hospital Admission: Yes  Primary Wound Type: Traumatic  Location: Chest  Wound Location Orientation: Lower; Right      Assessments 9/16/2021  3:58 PM   Wound Image     Drainage Amount Moderate   Drainage Description Serosanguineous   Woun

## 2021-09-16 NOTE — PROGRESS NOTES
Pending sale to [facility] Note: Antimicrobial Weight Based Dose Adjustment for: cefazolin (ANCEF)    Jemima Lovelace is a 19year old patient who has been prescribed cefazolin (ANCEF) 1000 mg x1   CrCl cannot be calculated (Patient's most recent lab result is older shell

## 2021-09-16 NOTE — CONSULTS
[facility] Gender Male or Female  Documentation      Stephania Tam is a 19year old patient   Legal Sex: male  Gender Identity: male  Sex at Birth: Female? Patient is on hormone replacement therapy.   Testosterone Enanthate Soln - 200 MG/ML  Patient has been on

## 2021-09-16 NOTE — PROGRESS NOTES
[facility] Note: Antimicrobial Weight Based Dose Adjustment for: piperacillin/tazobactam (Yue Hurtado)    Emilia Doyle is a 19year old patient who has been prescribed piperacillin/tazobactam (ZOSYN) 3.375g every 8 hours.     Estimated Creatinine Clearance (

## 2021-09-16 NOTE — PLAN OF CARE
NURSING ADMISSION NOTE      Patient admitted via Cart  Oriented to room. Safety precautions initiated. Bed in low position. Call light in reach. Admission navigator complete and med rec completed.  Admitted for right upper chest wound, collected a

## 2021-09-16 NOTE — PLAN OF CARE
Assumed care of patient @5956. A&Ox4. VSS. On RA. C/o pain to rt upper chest, PRN norco given per MAR. RT upper chest wound, dressings C/D/I. On Iv abx zoysn and vanco. Patient and mother updated on plan of care. Safety precautions in place.  Call light and Implement preventative oral hygiene regimen  - Implement oral medicated treatments as ordered  Outcome: Progressing     Problem: COPING  Goal: Pt/Family able to verbalize concerns and demonstrate effective coping strategies  Description: INTERVENTIONS:  -

## 2021-09-16 NOTE — CONSULTS
[facility]  Report of Psychiatric Consultation    Amanda Rojas Patient Status:  Observation    2000 MRN [id number]   [facility] 3NE-A Attending Lui Esquivel MD   Hosp Day # 1 PCP Catalina Garcia MD     Date of Admission:  wound infection. He has a hx of skin picking since 2017 when he is anxious and stressed. 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  He has temporarily relief from the picking Social and Developmental History: He lives with his mother. He has an associates degree. 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 He was on the probationary period and was not allowed to stay on the job.      Past Medical History HYDROcodone-acetaminophen (NORCO) 5-325 MG per tab 2 tablet, 2 tablet, Oral, Q4H PRN  •  morphINE sulfate (PF) 2 MG/ML injection 1 mg, 1 mg, Intravenous, Q2H PRN **OR** morphINE sulfate (PF) 2 MG/ML injection 2 mg, 2 mg, Intravenous, Q2H PRN **OR** morphIN substance abuse and suicidal ideas. The patient is nervous/anxious.       Mental Status Exam:     Objective       09/16/21  0730   BP: 105/59   Pulse: 70   Resp: 16   Temp: 98.1 °F (36.7 °C)     Appearance: fair grooming  Behavior: normal psychomotor  Attit

## 2021-09-16 NOTE — PROGRESS NOTES
[facility] Dosing Service    Initial Pharmacokinetic Consult for Vancomycin AUC Dosing    Meron Cr is a 19year old patient who is being treated for cellulitis. Pharmacy has been asked to dose vancomycin by Dr Meli Fermin.     Weights:  Ideal bod

## 2021-09-16 NOTE — PROGRESS NOTES
[facility] Dosing Service    Initial Pharmacokinetic Consult for Vancomycin AUC Dosing    Patricio Payne is a 19year old patient who is being treated for cellulitis. Pharmacy has been asked to dose vancomycin by Dr Nikki Moore.     Weights:  Ideal bod

## 2021-09-16 NOTE — PROGRESS NOTES
PSYCH CONSULT    Date of Admission: 9/15/21  Date of Consult: 9/16/21  Reason for Consultation: Anxiety, depression, wound picking behavior    Impression:  Psychiatric Diagnoses:   Body dysmorphia and severe OCD with compulsions to pick at his chest wound s

## 2021-09-16 NOTE — ED INITIAL ASSESSMENT (HPI)
States, \"I have a wound to my chest, seen at an ER last week for this but now it's gotten deeper and worse. Started out as a cyst, but I have an OCD tendency and picks on it. \" Pt denies SLAVA, denies fever

## 2021-09-16 NOTE — CONSULTS
[facility]  Report of Consultation    Amanda Rojas Patient Status:  Observation    2000 MRN [id number]   [facility] 3NE-A Attending Lui Esquivel MD   Hosp Day # 0 PCP Catalina Garcia MD     Reason for Consultation:  I am se asthma, unspecified    • Migraines    • Neuropathic pain    • OTHER DISEASES 2008    blastomycosis  treated  (minor)   • OTHER DISEASES 2008    chronic R rib pain ([facility])   • OTHER DISEASES     Delayed Bone age  ([place])   • OTHER DISEASES (NEURONTIN) 300 MG Oral Cap  Take 300 mg by mouth nightly. SUMAtriptan Succinate 50 MG Oral Tab  Take 50 mg by mouth every 2 (two) hours as needed for Migraine.     !! ClonazePAM 1 MG Oral Tab  Take 1 tablet (1 mg total) by mouth 2 (two) times daily as 99 %, not currently breastfeeding. General: Alert, orientated x3. Cooperative. No apparent distress. HEENT: Exam is unremarkable. Without scleral icterus. Lungs: Nonlabored. Normal effort.   Abdomen: Obese, soft, non-distended, non-tender, with no prominent right axillary lymph nodes are noted.     A representative right axillary lymph node measures 1.6 x 1.4 cm.    LIMITED ABDOMEN:  Limited images of the upper abdomen are unremarkable.     BONES:  No bony lesion or fracture.                        picking at this area due to his OCD. He was seen at [facility] last week and was given antibiotics but presented back to the ER with worsening erythema, pain, and drainage from the wound yesterday evening. Plan:  1.  The patient is stable and does not present  Extrem: No C/C/E        A/P  19year old adult with right chest wall wound     1. No acute surgical intervention  2. Okay for general diet  3. Continue local wound care with Aquacel silver  4.  Wound may benefit from bedside curettage to stimulate

## 2021-09-16 NOTE — PLAN OF CARE
proponranol 10 mg BID     Omeprazole 60 mg once daily     2108 bilateral mastectomy with hest masculinaton    2020  total vaginal hysterectomy

## 2021-09-16 NOTE — ED PROVIDER NOTES
Patient Seen in: [facility] Emergency Department      History   Patient presents with:  Cellulitis    Stated Complaint: cellulitis    Subjective:   HPI    20-year-old male presents emergency department who states has a wound on his chest.  Was seen is no erythema or exudate in the posterior pharynx  Neck: Supple no JVD no lymphadenopathy no meningismus no carotid bruit  CV: Regular rate and rhythm no murmur rub  Respiratory: Clear to auscultation bilaterally no crackles no wheezes no accessory muscle antibiotics. Due to the depth of the wound I fear this is significantly at risk for severe infection versus invading into the mediastinum.   Will call hospitalist for admission      XR CHEST AP PORTABLE  (CPT=71045)    Result Date: 9/15/2021  PROCEDURE:  X Disposition:  Admit  9/15/2021  9:19 pm    Follow-up:  No follow-up provider specified.         Medications Prescribed:  Current Discharge Medication List                          Hospital Problems             Present on Admission  Date Reviewed: 3/6/20

## 2021-09-16 NOTE — PROGRESS NOTES
[facility]     Hospitalist Progress Note     Amanda Cuff Patient Status:  Observation    2000 MRN [id number]   [facility] 3NE-A Attending Lui Esquivel MD   Hosp Day # 0 PCP Catalina Garcia MD     Chief Complaint: chest wou mg/kg (Adjusted) Intravenous Q12H   • enoxaparin  40 mg Subcutaneous Daily   • buPROPion HCl ER (SR)  450 mg Oral Daily   • clonazePAM  1.5 mg Oral BID   • DULoxetine  120 mg Oral BID   • gabapentin  300 mg Oral Nightly   • guanFACINE  1 mg Oral Nightly

## 2021-09-16 NOTE — H&P
[facility]  History and Physical     Ella Agee Patient Status:  Emergency    2000 MRN [id number]   Location [address] Attending López Cuenca MD   Hosp Day # 0 PCP Joao Remy MD     Chief Complaint: Piper Stephenson Comment:congestion    Medications:  No current facility-administered medications on file prior to encounter. Testosterone Enanthate 200 MG/ML Intramuscular Solution, Inject 200 mg into the skin once a week.  Wednesday, Disp: , Rfl: 1  SUMAtripta VENTOLIN  (90 BASE) MCG/ACT IN AERS, 2 puffs every 4 hours PRN asthma, Disp: , Rfl:       Review of Systems:   A comprehensive 14 point review of systems was completed. Pertinent positives and negatives noted in the HPI.     Physical Exam:    BP results for input(s): CK in the last 168 hours. Inflammatory Markers  No results for input(s): CRP, BINA, LDH, DDIMER in the last 168 hours. Imaging: Imaging data reviewed in Epic. ASSESSMENT / PLAN:     1.  Deep chest wound with purulent drainage a

## 2021-09-17 LAB — CREAT BLD-MCNC: 1.17 MG/DL

## 2021-09-17 PROCEDURE — 99225 SUBSEQUENT OBSERVATION CARE: CPT | Performed by: STUDENT IN AN ORGANIZED HEALTH CARE EDUCATION/TRAINING PROGRAM

## 2021-09-17 PROCEDURE — 99232 SBSQ HOSP IP/OBS MODERATE 35: CPT | Performed by: HOSPITALIST

## 2021-09-17 NOTE — PROGRESS NOTES
[facility]  Progress Note    Kiki Del Angel Patient Status:  Observation    2000 MRN [id number]   [facility] 3NE-A Attending Keara Sandra MD   Hosp Day # 0 PCP Rambo Machado MD     Subjective:   The patient is resting in bed Obsessive-compulsive disorder     Anemia     Excoriation (skin-picking) disorder     Obesity     Sprain of left rotator cuff capsule, initial encounter     Hypokalemia     Abdominal wall cellulitis     Depression     Hemorrhage from open wound of right selvin water  4. Antibiotics per ID  5. DVT prophylaxis Lovenox  6. GI prophylaxis Protonix  7. We will follow peripherally at this time. Please call or page with any questions or concerns.   Patient can follow-up in wound care clinic     Thank you for allowing m

## 2021-09-17 NOTE — PROGRESS NOTES
[facility]                INFECTIOUS DISEASE PROGRESS NOTE    Bhavesh Fermin Patient Status:  Observation    2000 MRN [id number]   [facility] 3NE-A Attending Eve Mike MD   Hosp Day # 0 PCP Susu Ivy MD     Anti Abnormal (Preliminary result)    Collection Time: 09/16/21  4:23 AM    Specimen: Chest drainage;  Other   Result Value Ref Range    Aerobic Culture Result 2+ growth Staphylococcus aureus (A) N/A    Aerobic Smear 3+ WBCs seen N/A    Aerobic Smear 1+ Gram Pos

## 2021-09-17 NOTE — PROGRESS NOTES
[facility]     Hospitalist Progress Note     Paulsusana Cr Patient Status:  Observation    2000 MRN [id number]   [facility] 3NE-A Attending Maggy Ramsay MD   Hosp Day # 0 PCP Cm Thompson MD     Chief Complaint: chest wou hours.    Imaging: Imaging data reviewed in Epic.     Medications:   • piperacillin-tazobactam  4.5 g Intravenous Q8H   • vancomycin  15 mg/kg (Adjusted) Intravenous Q12H   • enoxaparin  40 mg Subcutaneous Daily   • buPROPion HCl ER (SR)  450 mg Oral Daily

## 2021-09-17 NOTE — PLAN OF CARE
Pt is A&O x0. On room air, VS stable at this time. No complaints of pain at this time. Dressing on right upper chest changed by wound care, clean, dry, intact. Pt has IV antibiotic vancomycin every 12 hours. Zosyn discontinued.  Pt refused Ellipta inhaler Implement preventative oral hygiene regimen  - Implement oral medicated treatments as ordered  Outcome: Progressing     Problem: COPING  Goal: Pt/Family able to verbalize concerns and demonstrate effective coping strategies  Description: INTERVENTIONS:  -

## 2021-09-17 NOTE — PLAN OF CARE
Assumed care at [address]. Chest wound dressing C/D/I. Pt c/o of pain and states Norco PRN does not help manage the pain and declines morphine, notified MD and received orders for Toradol. On IV abx zosyn and vanco. Pt is A&O x4. On RA, . No orders for tele.

## 2021-09-17 NOTE — CM/SW NOTE
8: 52am  MSW reviewed chat. Surgery on consult-per Dr Castellon Oar no sx at this time.   Follow for Ivabex needs

## 2021-09-18 LAB — CREAT BLD-MCNC: 1.17 MG/DL

## 2021-09-18 PROCEDURE — 99232 SBSQ HOSP IP/OBS MODERATE 35: CPT | Performed by: HOSPITALIST

## 2021-09-18 RX ORDER — CEFADROXIL 500 MG/1
1 CAPSULE ORAL 2 TIMES DAILY
Qty: 56 CAPSULE | Refills: 0 | Status: SHIPPED | OUTPATIENT
Start: 2021-09-18 | End: 2021-10-02

## 2021-09-18 RX ORDER — CEFAZOLIN SODIUM/WATER 2 G/20 ML
2 SYRINGE (ML) INTRAVENOUS EVERY 8 HOURS
Status: DISCONTINUED | OUTPATIENT
Start: 2021-09-18 | End: 2021-09-19

## 2021-09-18 NOTE — PLAN OF CARE
Assumed care at 0700, A/Ox4, R/A, No tele. Up ad zaira. Tolerating diet. Toradol for pain w/relief. IV abx switched to Ancef. Wound dressing changed by this RN according to [facility] instructions. Pt to do outpt psych day program in [place] upon discharge.  Will co

## 2021-09-18 NOTE — PROGRESS NOTES
[facility]     Hospitalist Progress Note     Emilie Odonnell Patient Status:  Observation    2000 MRN [id number]   [facility] 3NE-A Attending Briana Cedeño MD   Hosp Day # 1 PCP Veronica Diallo MD     Chief Complaint: chest wou hours.    Creatinine Kinase  No results for input(s): CK in the last 168 hours. Inflammatory Markers  No results for input(s): CRP, BINA, LDH, DDIMER in the last 168 hours. Imaging: Imaging data reviewed in Epic.     Medications:   • ceFAZolin  2 g Int

## 2021-09-18 NOTE — PROGRESS NOTES
[facility]                INFECTIOUS DISEASE PROGRESS NOTE    Alejandraflex Watson Patient Status:  Observation    2000 MRN [id number]   [facility] 3NE-A Attending Yung Irwin MD   Hosp Day # 1 PCP Noni Diaz MD     Anti Patient arrives with a chief complaint of left sided chest pain that began around 0500. Patient denies any other complaints at this time but states the pain radiates to his left shoulder and left elbow.    ALB 3.5  --   --      --   --    K 3.7  --   --      --   --    CO2 20.0*  --   --    ALKPHO 128  --   --    AST 13*  --   --    ALT 19  --   --    BILT 0.4  --   --    TP 8.2  --   --        No results found for: [facility]  Microbiology    Hospi rotator cuff capsule, initial encounter     Hypokalemia     Abdominal wall cellulitis     Depression     Hemorrhage from open wound of right chest wall     Hemorrhage from open wound of right chest wall, initial encounter     Cellulitis of chest wall

## 2021-09-18 NOTE — PLAN OF CARE
Assumed care at [address]. A&Ox4, RA, no tele, afebrile, VSS. Tolerating meds well. Regular diet. Vanco Q12 IV. R FA SL. R upper arm SL. Lovenox subcutaneous. No c/o pain, no c/o n/v. Denies SOB. Up ad zaira. Blood cultures pending. Wound care Q48.  Silver aquagel Assess oral mucosa and hygiene practices  - Implement preventative oral hygiene regimen  - Implement oral medicated treatments as ordered  Outcome: Progressing     Problem: COPING  Goal: Pt/Family able to verbalize concerns and demonstrate effective coping

## 2021-09-18 NOTE — PROGRESS NOTES
Patient's aware that Mya Simon recommended  outpatient [facility], for psych medications adjustment and psychotherapy. Explained to patient currently there is no opening at [facility] outpatient for the next whole week.  Patient intereste

## 2021-09-19 VITALS
WEIGHT: 315 LBS | OXYGEN SATURATION: 98 % | HEIGHT: 73 IN | TEMPERATURE: 98 F | RESPIRATION RATE: 18 BRPM | HEART RATE: 65 BPM | BODY MASS INDEX: 41.75 KG/M2 | SYSTOLIC BLOOD PRESSURE: 126 MMHG | DIASTOLIC BLOOD PRESSURE: 68 MMHG

## 2021-09-19 LAB
CREAT BLD-MCNC: 1.17 MG/DL
PLATELET # BLD AUTO: 319 10(3)UL (ref 150–450)

## 2021-09-19 PROCEDURE — 99239 HOSP IP/OBS DSCHRG MGMT >30: CPT | Performed by: HOSPITALIST

## 2021-09-19 RX ORDER — PROPRANOLOL HYDROCHLORIDE 10 MG/1
10 TABLET ORAL 2 TIMES DAILY
Qty: 60 TABLET | Refills: 0 | Status: SHIPPED | OUTPATIENT
Start: 2021-09-19 | End: 2021-09-22 | Stop reason: DRUGHIGH

## 2021-09-19 NOTE — PROGRESS NOTES
Assumed care at 0700, A/Ox4, R/A, no tele. Outpt day treatment set up in [place]. ID signed off. [address] today. Will continue to monitor.

## 2021-09-19 NOTE — PROGRESS NOTES
[facility]     Hospitalist Progress Note     Bhavesh Fermin Patient Status:  Observation    2000 MRN [id number]   [facility] 3NE-A Attending Eve Mike MD   Hosp Day # 2 PCP Susu Ivy MD     Chief Complaint: chest wou Date    COVID19 Not Detected 09/15/2021       Pro-Calcitonin  No results for input(s): PCT in the last 168 hours. Cardiac  No results for input(s): TROP, PBNP in the last 168 hours.     Creatinine Kinase  No results for input(s): CK in the last 168 hours

## 2021-09-19 NOTE — PROGRESS NOTES
NURSING DISCHARGE NOTE    Discharged Home via [place]. Accompanied by Support staff  Belongings Taken by patient/family. Discharge paperwork provided and explained. All questions and concerns addressed.  Patient taken to [facility] lobby to [facility] family

## 2021-09-19 NOTE — PLAN OF CARE
Pt. A&O x4, on RA, no tele. VSS. Up ad zaira. No c/o pain or n/v. Saline locked. Lovenox for prophylaxis. Wound c/d/I. Fall and safety precautions in place. Will continue to monitor.      Problem: Patient/Family Goals  Goal: Patient/Family Long Term Goal  Yaw demonstrate effective coping strategies  Description: INTERVENTIONS:  - Assist patient/family to identify coping skills, available support systems and cultural and spiritual values  - Provide emotional support, including active listening and acknowledgemen

## 2021-09-20 NOTE — DISCHARGE SUMMARY
[facility]  DISCHARGE SUMMARY     Levell Fulling Patient Status:  Inpatient    2000 MRN [id number]   [facility] 3NE-A Attending No att. providers found   Hosp Day # 2 PCP Keira Schneider MD     Date of Admission: 9/15/2021  D Prescription details   Cefadroxil 500 MG Caps  Commonly known as: DURICEF      Take 2 capsules (1,000 mg total) by mouth 2 (two) times daily for 14 days.    Stop taking on: October 2, 2021  Quantity: 56 capsule  Refills: 0     propranolol 10 MG Tabs  Common 0     lamoTRIgine 25 MG Tabs  Commonly known as: LAMICTAL      Take 3 tablets (75 mg total) by mouth 2 (two) times daily. Quantity: 90 tablet  Refills: 0     Mometasone Furoate 110 MCG/INH Aepb      Inhale 1 puff into the lungs daily.  Pt's own medication guarding. Neurologic: No focal neurological deficits. Musculoskeletal: Moves all extremities. Extremities: No edema.   -----------------------------------------------------------------------------------------------  PATIENT DISCHARGE INSTRUCTIONS: See e

## 2021-09-23 NOTE — PAYOR COMM NOTE
--------------  DISCHARGE REVIEW    Payor: Evans Montague #:  [id number]  Authorization Number: [id number]    Admit date: 9/17/21  Admit time:  12:51 PM  Discharge Date: 9/19/2021  5:28 PM     Admitting Physician: Erum Jacobs MD  Attending it due to OCD.  No fever or chills.  No chest pain or shortness of breath.  He states the wound is tender to palpation.         Lace+ Score: 51 ( )  59-90 High Risk  29-58 Medium Risk  0-28   Low Risk         TCM Follow-Up Recommendation:  LACE > 58:  High take      Take 1 capsule (60 mg total) by mouth 2 (two) times daily.    Quantity: 30 capsule  Refills: 0     Lisdexamfetamine Dimesylate 50 MG Caps  Commonly known as: VYVANSE  What changed: how much to take      Take 1 capsule (50 mg total) by mouth every  Caps         [place]: n[place]    Follow-up appointment:   Boy Cristina MD  [address] [place] [address]  [phone]    In 2 weeks      Appointments for Next 30 Days 9/20/2021 - 10/20/2021            None

## 2021-09-23 NOTE — PAYOR COMM NOTE
STARTED OUT OBSERVATION ON 9/16  MADE INPT 9/17    ADMISSION REVIEW     Payor: Kaila Puentes #:  [id number]  Authorization Number: [id number]    Admit date: 9/17/21  Admit time: 12:51 PM       REVIEW DOCUMENTATION:     ED Provider Notes protective equipment including droplet mask, eye protection, and gloves were worn throughout the duration of the exam.  Handwashing was performed prior to and after the exam.  Stethoscope and any equipment used during my examination was cleaned with super -----------         ------                     CBC W/ DIFFERENTIAL[[id number]]                              Final result                 Please view results for these tests on the individual orders.    RAINBOW DRAW LAVENDER   RAINBOW DRAW LIGHT GREEN   RAINB Dragon Medical voice recognition dictation software. As a result errors may occur. When identified these errors have been corrected.  While every attempt is made to correct errors during dictation discrepancies may still exist  Admission disposition: 9/15/2 breath. He states the wound is tender to palpation.     Past Medical History:  Past Medical History:   Diagnosis Date   • ADD (attention deficit disorder)    • ALLERGIC RHINITIS    • Anxiety    • ASTHMA    • Asthma    • Blastomycosis    [facility]    • times daily. , Disp: 90 tablet, Rfl: 0  topiramate 50 MG Oral Tab, Take 1 tablet (50 mg total) by mouth 2 (two) times daily. , Disp: 30 tablet, Rfl: 0  BuPROPion HCl ER, SR, 150 MG Oral Tablet 12 Hr, Take 1 tablet (150 mg total) by mouth 2 (two) times daily. rate and rhythm. No murmurs, rubs or gallops. Equal pulses. Chest and Back: Deep wound in anterior chest that is about 5 cm deep with ribs exposed and purulent drainage  Abdomen: Soft, nontender, nondistended. Positive bowel sounds.  No rebound, guarding may discontinue isolation: Yes     Plan of care discussed with patient, RN.     Virgil Vinson DO  9/15/2021    Electronically signed by Maryjane Vallecillo DO on 9/16/2021  2:19 AM         MEDICATIONS ADMINISTERED IN LAST 1 DAY:      Vitals (last day) before dis antibiotics. He denies any fevers or chills. He states he requires his psychiatric medications while hospitalized.      The patient has past surgical history bilateral mastectomy and total hysterectomy. The patient is transitioning from female to male. fluid collection. 4. Okay to begin psychiatric medications from surgical standpoint  5. N.p.o. status–sips with meds  6. Thank you for the consultation. We will continue to follow.     I spent 25 minutes face to face with the patient.  More than 50% of th the wound  7. Antibiotics per ID  8. Psych see patient  9. DVT prophylaxis Lovenox  10.  GI prophylaxis Protonix     Case discussed with RN     9/16 PSYCH CONSULT NOTE  Date of Consult: 9/16/21  Reason for Consultation: Anxiety, depression, wound picking be time.Pain medication given by the nurse earlier       Recommendations/Wound Cleaning and Dressings:  Showering directions: Cleanse with saline or wound cleanser  Wound cleaning frequency: Every 48 hours  Wound product: Gently pack with silver aquacel. Cover exposed ribs- Osteomyelitis  ? Continue Antibiotics  ? Sx consult on consult  ? ID consult  ?  Follow Cx results  · OCD  · Anxiety  · ADD  · Depression  · ADD  · Migraines  · Asthma     Plan of care: as above    9/17 ID CONSULT NOTE  Antibiotics: zosyn#1-dc Aquacel silver, patient not having any further fever or chills. Tolerating a regular diet. No other acute concerns or issues at this time     General: Alert, orientated x3. Cooperative. No apparent distress.   CV: regular rate  Pulm: respirations unlabore 118/69    Integument: Open wound to R chest with bloody drainage noted and packing in place. No surrounding erythema. No purulent drainage. ASSESSMENT/PLAN:  1.  Right chest wall abscess, large open wound  - local wound care as per surgery  - follow bl

## 2021-10-11 ENCOUNTER — ANESTHESIA (OUTPATIENT)
Dept: SURGERY | Facility: HOSPITAL | Age: 21
DRG: 571 | End: 2021-10-11
Payer: COMMERCIAL

## 2021-10-11 ENCOUNTER — HOSPITAL ENCOUNTER (INPATIENT)
Facility: HOSPITAL | Age: 21
LOS: 1 days | Discharge: HOME | DRG: 571 | End: 2021-10-14
Attending: EMERGENCY MEDICINE | Admitting: INTERNAL MEDICINE
Payer: COMMERCIAL

## 2021-10-11 ENCOUNTER — ANESTHESIA EVENT (OUTPATIENT)
Dept: SURGERY | Facility: HOSPITAL | Age: 21
DRG: 571 | End: 2021-10-11
Payer: COMMERCIAL

## 2021-10-11 DIAGNOSIS — S21.111A LACERATION OF CHEST WALL, RIGHT, INITIAL ENCOUNTER: Primary | ICD-10-CM

## 2021-10-11 PROBLEM — S21.119A LACERATION OF CHEST WALL: Status: ACTIVE | Noted: 2021-10-11

## 2021-10-11 PROCEDURE — 3078F DIAST BP <80 MM HG: CPT | Performed by: HOSPITALIST

## 2021-10-11 PROCEDURE — 99253 IP/OBS CNSLTJ NEW/EST LOW 45: CPT | Performed by: SURGERY

## 2021-10-11 PROCEDURE — 0JB60ZZ EXCISION OF CHEST SUBCUTANEOUS TISSUE AND FASCIA, OPEN APPROACH: ICD-10-PCS | Performed by: SURGERY

## 2021-10-11 PROCEDURE — 3074F SYST BP LT 130 MM HG: CPT | Performed by: HOSPITALIST

## 2021-10-11 PROCEDURE — 99223 1ST HOSP IP/OBS HIGH 75: CPT | Performed by: HOSPITALIST

## 2021-10-11 PROCEDURE — 3008F BODY MASS INDEX DOCD: CPT | Performed by: HOSPITALIST

## 2021-10-11 RX ORDER — TOPIRAMATE 25 MG/1
50 TABLET ORAL 2 TIMES DAILY
Status: DISCONTINUED | OUTPATIENT
Start: 2021-10-11 | End: 2021-10-12

## 2021-10-11 RX ORDER — DULOXETIN HYDROCHLORIDE 30 MG/1
30 CAPSULE, DELAYED RELEASE ORAL DAILY
Status: DISCONTINUED | OUTPATIENT
Start: 2021-10-11 | End: 2021-10-12

## 2021-10-11 RX ORDER — QUETIAPINE 25 MG/1
75 TABLET, FILM COATED ORAL NIGHTLY
Status: DISCONTINUED | OUTPATIENT
Start: 2021-10-11 | End: 2021-10-14

## 2021-10-11 RX ORDER — HYDROCODONE BITARTRATE AND ACETAMINOPHEN 5; 325 MG/1; MG/1
2 TABLET ORAL AS NEEDED
Status: DISCONTINUED | OUTPATIENT
Start: 2021-10-11 | End: 2021-10-11 | Stop reason: HOSPADM

## 2021-10-11 RX ORDER — LAMOTRIGINE 100 MG/1
100 TABLET ORAL 2 TIMES DAILY
Status: DISCONTINUED | OUTPATIENT
Start: 2021-10-11 | End: 2021-10-14

## 2021-10-11 RX ORDER — LIDOCAINE HYDROCHLORIDE 10 MG/ML
INJECTION, SOLUTION EPIDURAL; INFILTRATION; INTRACAUDAL; PERINEURAL AS NEEDED
Status: DISCONTINUED | OUTPATIENT
Start: 2021-10-11 | End: 2021-10-11 | Stop reason: SURG

## 2021-10-11 RX ORDER — HYDROCODONE BITARTRATE AND ACETAMINOPHEN 5; 325 MG/1; MG/1
1 TABLET ORAL AS NEEDED
Status: DISCONTINUED | OUTPATIENT
Start: 2021-10-11 | End: 2021-10-11 | Stop reason: HOSPADM

## 2021-10-11 RX ORDER — ONDANSETRON 2 MG/ML
4 INJECTION INTRAMUSCULAR; INTRAVENOUS EVERY 6 HOURS PRN
Status: DISCONTINUED | OUTPATIENT
Start: 2021-10-11 | End: 2021-10-14

## 2021-10-11 RX ORDER — GABAPENTIN 300 MG/1
300 CAPSULE ORAL 2 TIMES DAILY
Status: DISCONTINUED | OUTPATIENT
Start: 2021-10-11 | End: 2021-10-12

## 2021-10-11 RX ORDER — DEXAMETHASONE SODIUM PHOSPHATE 4 MG/ML
4 VIAL (ML) INJECTION AS NEEDED
Status: DISCONTINUED | OUTPATIENT
Start: 2021-10-11 | End: 2021-10-11 | Stop reason: HOSPADM

## 2021-10-11 RX ORDER — CLOMIPRAMINE HYDROCHLORIDE 25 MG/1
25 CAPSULE ORAL NIGHTLY
Status: DISCONTINUED | OUTPATIENT
Start: 2021-10-11 | End: 2021-10-12

## 2021-10-11 RX ORDER — HYDROCODONE BITARTRATE AND ACETAMINOPHEN 5; 325 MG/1; MG/1
2 TABLET ORAL EVERY 4 HOURS PRN
Status: DISCONTINUED | OUTPATIENT
Start: 2021-10-11 | End: 2021-10-14

## 2021-10-11 RX ORDER — SODIUM CHLORIDE 9 MG/ML
INJECTION, SOLUTION INTRAVENOUS CONTINUOUS
Status: DISCONTINUED | OUTPATIENT
Start: 2021-10-11 | End: 2021-10-11

## 2021-10-11 RX ORDER — MIDAZOLAM HYDROCHLORIDE 1 MG/ML
1 INJECTION INTRAMUSCULAR; INTRAVENOUS EVERY 5 MIN PRN
Status: DISCONTINUED | OUTPATIENT
Start: 2021-10-11 | End: 2021-10-11 | Stop reason: HOSPADM

## 2021-10-11 RX ORDER — CLONAZEPAM 0.5 MG/1
2 TABLET ORAL 2 TIMES DAILY
Status: DISCONTINUED | OUTPATIENT
Start: 2021-10-11 | End: 2021-10-14

## 2021-10-11 RX ORDER — HYDROMORPHONE HYDROCHLORIDE 1 MG/ML
0.4 INJECTION, SOLUTION INTRAMUSCULAR; INTRAVENOUS; SUBCUTANEOUS EVERY 5 MIN PRN
Status: DISCONTINUED | OUTPATIENT
Start: 2021-10-11 | End: 2021-10-11 | Stop reason: HOSPADM

## 2021-10-11 RX ORDER — GLYCOPYRROLATE 0.2 MG/ML
INJECTION, SOLUTION INTRAMUSCULAR; INTRAVENOUS AS NEEDED
Status: DISCONTINUED | OUTPATIENT
Start: 2021-10-11 | End: 2021-10-11 | Stop reason: SURG

## 2021-10-11 RX ORDER — HYDROCODONE BITARTRATE AND ACETAMINOPHEN 5; 325 MG/1; MG/1
1 TABLET ORAL EVERY 4 HOURS PRN
Status: DISCONTINUED | OUTPATIENT
Start: 2021-10-11 | End: 2021-10-14

## 2021-10-11 RX ORDER — NEOSTIGMINE METHYLSULFATE 1 MG/ML
INJECTION INTRAVENOUS AS NEEDED
Status: DISCONTINUED | OUTPATIENT
Start: 2021-10-11 | End: 2021-10-11 | Stop reason: SURG

## 2021-10-11 RX ORDER — ONDANSETRON 2 MG/ML
4 INJECTION INTRAMUSCULAR; INTRAVENOUS AS NEEDED
Status: DISCONTINUED | OUTPATIENT
Start: 2021-10-11 | End: 2021-10-11 | Stop reason: HOSPADM

## 2021-10-11 RX ORDER — ACETAMINOPHEN 325 MG/1
650 TABLET ORAL EVERY 4 HOURS PRN
Status: DISCONTINUED | OUTPATIENT
Start: 2021-10-11 | End: 2021-10-14

## 2021-10-11 RX ORDER — ONDANSETRON 2 MG/ML
INJECTION INTRAMUSCULAR; INTRAVENOUS AS NEEDED
Status: DISCONTINUED | OUTPATIENT
Start: 2021-10-11 | End: 2021-10-11 | Stop reason: SURG

## 2021-10-11 RX ORDER — METOCLOPRAMIDE HYDROCHLORIDE 5 MG/ML
INJECTION INTRAMUSCULAR; INTRAVENOUS AS NEEDED
Status: DISCONTINUED | OUTPATIENT
Start: 2021-10-11 | End: 2021-10-11 | Stop reason: SURG

## 2021-10-11 RX ORDER — NALOXONE HYDROCHLORIDE 0.4 MG/ML
80 INJECTION, SOLUTION INTRAMUSCULAR; INTRAVENOUS; SUBCUTANEOUS AS NEEDED
Status: DISCONTINUED | OUTPATIENT
Start: 2021-10-11 | End: 2021-10-11 | Stop reason: HOSPADM

## 2021-10-11 RX ORDER — BUPROPION HYDROCHLORIDE 300 MG/1
300 TABLET ORAL DAILY
Status: DISCONTINUED | OUTPATIENT
Start: 2021-10-12 | End: 2021-10-14

## 2021-10-11 RX ORDER — PROPRANOLOL HYDROCHLORIDE 20 MG/1
20 TABLET ORAL
Status: DISCONTINUED | OUTPATIENT
Start: 2021-10-12 | End: 2021-10-14

## 2021-10-11 RX ORDER — CEFAZOLIN SODIUM 1 G/3ML
INJECTION, POWDER, FOR SOLUTION INTRAMUSCULAR; INTRAVENOUS AS NEEDED
Status: DISCONTINUED | OUTPATIENT
Start: 2021-10-11 | End: 2021-10-11 | Stop reason: SURG

## 2021-10-11 RX ORDER — ALBUTEROL SULFATE 90 UG/1
2 AEROSOL, METERED RESPIRATORY (INHALATION) EVERY 4 HOURS PRN
Status: DISCONTINUED | OUTPATIENT
Start: 2021-10-11 | End: 2021-10-14

## 2021-10-11 RX ORDER — ROCURONIUM BROMIDE 10 MG/ML
INJECTION, SOLUTION INTRAVENOUS AS NEEDED
Status: DISCONTINUED | OUTPATIENT
Start: 2021-10-11 | End: 2021-10-11 | Stop reason: SURG

## 2021-10-11 RX ORDER — ENOXAPARIN SODIUM 100 MG/ML
0.5 INJECTION SUBCUTANEOUS DAILY
Status: DISCONTINUED | OUTPATIENT
Start: 2021-10-11 | End: 2021-10-14

## 2021-10-11 RX ORDER — METOCLOPRAMIDE HYDROCHLORIDE 5 MG/ML
10 INJECTION INTRAMUSCULAR; INTRAVENOUS AS NEEDED
Status: DISCONTINUED | OUTPATIENT
Start: 2021-10-11 | End: 2021-10-11 | Stop reason: HOSPADM

## 2021-10-11 RX ORDER — DEXTROAMPHETAMINE SACCHARATE, AMPHETAMINE ASPARTATE, DEXTROAMPHETAMINE SULFATE AND AMPHETAMINE SULFATE 2.5; 2.5; 2.5; 2.5 MG/1; MG/1; MG/1; MG/1
10 TABLET ORAL
Status: DISCONTINUED | OUTPATIENT
Start: 2021-10-12 | End: 2021-10-14

## 2021-10-11 RX ORDER — SODIUM CHLORIDE, SODIUM LACTATE, POTASSIUM CHLORIDE, CALCIUM CHLORIDE 600; 310; 30; 20 MG/100ML; MG/100ML; MG/100ML; MG/100ML
INJECTION, SOLUTION INTRAVENOUS CONTINUOUS
Status: DISCONTINUED | OUTPATIENT
Start: 2021-10-11 | End: 2021-10-11 | Stop reason: HOSPADM

## 2021-10-11 RX ORDER — SODIUM CHLORIDE, SODIUM LACTATE, POTASSIUM CHLORIDE, CALCIUM CHLORIDE 600; 310; 30; 20 MG/100ML; MG/100ML; MG/100ML; MG/100ML
INJECTION, SOLUTION INTRAVENOUS CONTINUOUS
Status: DISCONTINUED | OUTPATIENT
Start: 2021-10-11 | End: 2021-10-14

## 2021-10-11 RX ORDER — BUPROPION HYDROCHLORIDE 150 MG/1
150 TABLET, EXTENDED RELEASE ORAL DAILY
Status: DISCONTINUED | OUTPATIENT
Start: 2021-10-12 | End: 2021-10-14

## 2021-10-11 RX ORDER — PROCHLORPERAZINE EDISYLATE 5 MG/ML
5 INJECTION INTRAMUSCULAR; INTRAVENOUS EVERY 8 HOURS PRN
Status: DISCONTINUED | OUTPATIENT
Start: 2021-10-11 | End: 2021-10-14

## 2021-10-11 RX ORDER — SODIUM CHLORIDE 9 MG/ML
INJECTION, SOLUTION INTRAVENOUS CONTINUOUS
Status: DISCONTINUED | OUTPATIENT
Start: 2021-10-11 | End: 2021-10-14

## 2021-10-11 RX ORDER — CEFAZOLIN SODIUM/WATER 2 G/20 ML
2 SYRINGE (ML) INTRAVENOUS ONCE
Status: COMPLETED | OUTPATIENT
Start: 2021-10-11 | End: 2021-10-11

## 2021-10-11 RX ADMIN — LIDOCAINE HYDROCHLORIDE 50 MG: 10 INJECTION, SOLUTION EPIDURAL; INFILTRATION; INTRACAUDAL; PERINEURAL at 20:23:00

## 2021-10-11 RX ADMIN — ROCURONIUM BROMIDE 30 MG: 10 INJECTION, SOLUTION INTRAVENOUS at 20:32:00

## 2021-10-11 RX ADMIN — METOCLOPRAMIDE HYDROCHLORIDE 10 MG: 5 INJECTION INTRAMUSCULAR; INTRAVENOUS at 20:32:00

## 2021-10-11 RX ADMIN — GLYCOPYRROLATE 0.6 MG: 0.2 INJECTION, SOLUTION INTRAMUSCULAR; INTRAVENOUS at 20:49:00

## 2021-10-11 RX ADMIN — NEOSTIGMINE METHYLSULFATE 5 MG: 1 INJECTION INTRAVENOUS at 20:49:00

## 2021-10-11 RX ADMIN — ONDANSETRON 4 MG: 2 INJECTION INTRAMUSCULAR; INTRAVENOUS at 20:32:00

## 2021-10-11 RX ADMIN — CEFAZOLIN SODIUM 3 G: 1 INJECTION, POWDER, FOR SOLUTION INTRAMUSCULAR; INTRAVENOUS at 20:26:00

## 2021-10-11 NOTE — ED PROVIDER NOTES
Patient Seen in: [facility] Emergency Department      History   Patient presents with:  Rash Skin Problem    Stated Complaint: wound to chest, from outpatient [facility], has OCD and picks at skin    Subjective:   HPI    Patient here with chest wound that 2020    total vaginal hysterectomy   • MASTECTOMY LEFT  2018   • MASTECTOMY RIGHT  2018   • OTHER SURGICAL HISTORY  2018    bilateral mastecomy with chest maculinization                Social History    Tobacco Use      Smoking status: Never Smoker      Sm Course     Labs Reviewed   COMP METABOLIC PANEL (14) - Abnormal; Notable for the following components:       Result Value    Glucose 122 (*)     A/G Ratio 0.8 (*)     All other components within normal limits   PTT, ACTIVATED - Normal   PROTHROMBIN TIME (P Impression:  Laceration of chest wall, right, initial encounter  (primary encounter diagnosis)     Disposition:  Admit  10/11/2021  5:05 pm    Follow-up:  No follow-up provider specified.         Medications Prescribed:  Current Discharge Medication List

## 2021-10-11 NOTE — ED INITIAL ASSESSMENT (HPI)
Pt states is outpatient at [facility] for OCD, states has been \"digging\" into his chest to peel off skin, states compulsive behavior was worse last night, sent to ER for evaluation of wound to right upper chest.  Denies wanting to hurt self.

## 2021-10-11 NOTE — CONSULTS
[facility]  Report of  Surgical Consultation with History and Physical Exam    Geradine Night Patient Status:  Emergency    2000 MRN [id number]   Location [address] [facility] Attending Jennifer Renteria MD   Hosp Day # 0 PCP B age  ([place])   • OTHER DISEASES     nut allergy     Past Surgical History:   Procedure Laterality Date   • HYSTERECTOMY  2020    total vaginal hysterectomy   • MASTECTOMY LEFT  2018   • MASTECTOMY RIGHT  2018   • OTHER SURGICAL HISTORY  2018    bilateral to right chest.  Negative for pruritus and rash  Musculoskeletal:  Negative for bone/joint symptoms  Neurological:  Negative for gait disturbance  Psychiatric:  Negative for inappropriate interaction and psychiatric symptoms  Respiratory:  Negative for cou inattentive type     Sprain of calcaneofibular (ligament) of ankle     Chondromalacia of patella     Pain in joint, lower leg     Anxiety disorder     Anaphylactic reaction     Allergic reaction     Anaphylaxis     Migraine     Intractable chronic migraine placed hemostatic powder last night to control the bleeding. He came to the emergency room for further evaluation. Currently, he is without any complaints. General: Alert, orientated x3. Cooperative. No apparent distress.   Chest: Right chest dressing

## 2021-10-11 NOTE — H&P
[facility]  History and Physical     Clovis Candelario Patient Status:  Emergency    2000 MRN [id number]   Location [address] [facility] Attending Damir Nathan MD   Hosp Day # 0 PCP Ramya Ferris MD     Chief Complain myeloma    Heart Disease Maternal Grandmother     Hypertension Maternal Grandmother     Heart Disease Maternal Grandfather     Stroke Maternal Grandfather     Alcohol and Other Disorders Associated Maternal Grandfather     No Known Problems Mother     Nikhil Talavera the skin every 28 days. , Disp: , Rfl:   Rimegepant Sulfate (NURTEC) 75 MG Oral Tablet Dispersible, Take 75 mg by mouth as needed. , Disp: , Rfl:   topiramate 50 MG Oral Tab, Take 1 tablet (50 mg total) by mouth 2 (two) times daily. , Disp: 30 tablet, Rfl: 0 maximum 7 days allowed. ).     Recent Labs   Lab 10/11/21  1652   PTP 14.0   INR 1.06       COVID-19 Lab Results    COVID-19  Lab Results   Component Value Date    COVID19 Not Detected 09/15/2021       Pro-Calcitonin  No results for input(s): PCT in the last

## 2021-10-11 NOTE — CONSULTS
[facility] Pharmacy Note: Antimicrobial Weight Based Dose Adjustment for: cefazolin (ANCEF)    Kiki Del Angel is a 19year old patient who has been prescribed cefazolin (ANCEF) 1000 mg x1 dose.     CrCl cannot be calculated (Patient's most recent lab result is ol

## 2021-10-12 PROCEDURE — 99231 SBSQ HOSP IP/OBS SF/LOW 25: CPT | Performed by: HOSPITALIST

## 2021-10-12 PROCEDURE — 90792 PSYCH DIAG EVAL W/MED SRVCS: CPT | Performed by: OTHER

## 2021-10-12 RX ORDER — TOPIRAMATE 25 MG/1
100 TABLET ORAL 2 TIMES DAILY
Status: DISCONTINUED | OUTPATIENT
Start: 2021-10-12 | End: 2021-10-14

## 2021-10-12 RX ORDER — CLOMIPRAMINE HYDROCHLORIDE 50 MG/1
50 CAPSULE ORAL NIGHTLY
Status: DISCONTINUED | OUTPATIENT
Start: 2021-10-12 | End: 2021-10-14

## 2021-10-12 RX ORDER — POTASSIUM CHLORIDE 20 MEQ/1
40 TABLET, EXTENDED RELEASE ORAL ONCE
Status: COMPLETED | OUTPATIENT
Start: 2021-10-12 | End: 2021-10-12

## 2021-10-12 RX ORDER — GABAPENTIN 100 MG/1
100 CAPSULE ORAL 2 TIMES DAILY
Status: DISCONTINUED | OUTPATIENT
Start: 2021-10-12 | End: 2021-10-14

## 2021-10-12 RX ORDER — DULOXETIN HYDROCHLORIDE 60 MG/1
60 CAPSULE, DELAYED RELEASE ORAL DAILY
Status: COMPLETED | OUTPATIENT
Start: 2021-10-12 | End: 2021-10-12

## 2021-10-12 RX ORDER — DULOXETIN HYDROCHLORIDE 30 MG/1
30 CAPSULE, DELAYED RELEASE ORAL DAILY
Status: DISCONTINUED | OUTPATIENT
Start: 2021-10-13 | End: 2021-10-14

## 2021-10-12 NOTE — ED QUICK NOTES
Report received from Leslie Warren, [place]. Patient resting comfortably on cart, waiting to go to IR.

## 2021-10-12 NOTE — CONSULTS
[facility]  Report of Psychiatric Consultation    Sam Goodwin Patient Status:  Observation    2000 MRN [id number]   [facility] 3NE-A Attending Shannan Ayon MD   Hosp Day # 1 PCP Caitlin Multani MD     Date of Admission: 8 since 2017 when he is anxious and stressed. 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  He has temporarily relief from the pickin first major depression episode occurred after his father  of cancer when he was 4 yr old. When he was 13 yr old, he attempted suicide by ingesting peanuts with the intent of triggering an anaphylaxis reaction to kill himself.  After he ate the peanuts, Other Disorders Associated Maternal Grandfather    • No Known Problems Mother    • Allergies Sister    • Allergies Brother       reports that he has never smoked.  He has never used smokeless tobacco. He reports that he does not drink alcohol and does not u (ANAFRANIL) cap 25 mg, 25 mg, Oral, Nightly    Review of Systems   Constitutional: Positive for malaise/fatigue. Psychiatric/Behavioral: Positive for depression. Negative for hallucinations, substance abuse and suicidal ideas.  The patient is nervous/anxi

## 2021-10-12 NOTE — PROGRESS NOTES
[facility] Pharmacy Note:  Anticoagulation Weight Dose Adjustment for enoxaparin    Yudi Maher is a 19year old patient who has been prescribed enoxaparin 40 mg every 24 hours.       Estimated Creatinine Clearance (based on SCr of 1.1 mg/dL)  Female: 94.7 mL/

## 2021-10-12 NOTE — PROGRESS NOTES
NURSING ADMISSION NOTE      Patient admitted via Cart  Oriented to room. Safety precautions initiated. Bed in low position. Call light in reach. PT assumed from PACU at 22:40. PT alert and orientated times 4. NSR/ SB down to 58 on tele.   97% on

## 2021-10-12 NOTE — OPERATIVE REPORT
[facility]  Op Note    Valma Esters Location: OR   CSN [id number] MRN [id number]   Admission Date 10/11/2021 Operation Date 10/11/2021   Attending Physician James Hopkins MD Operating Physician Svitlana Pablo MD   DATE OF OPERATION:  10/1 cm medial and inferior to the original wound. The wound was then copiously irrigated with normal saline. It was packed with a saline moistened Kerlix roll. Dressings were applied externally. The patient tolerated the procedure well.   He was extubated i

## 2021-10-12 NOTE — PLAN OF CARE
Patient here with a self-inflicted wound. Wound was cleaned out by surgery last night. Dressing changed today per orders. Patient denies pain. On iv antibiotics. Sitter at bedside to prevent patient from touching wound.  Patient updated on plan of care and Problem: DISCHARGE PLANNING  Goal: Discharge to home or other facility with appropriate resources  Description: INTERVENTIONS:  - Identify barriers to discharge w/pt and caregiver  - Include patient/family/discharge partner in discharge Bran Aldana consults as appropriate with Behavioral Health, Spiritual Care  - Evaluate care setting prior to admitting patient removing all contraband  - Remove all personal property per policy  Outcome: Progressing

## 2021-10-12 NOTE — ANESTHESIA POSTPROCEDURE EVALUATION
[address] Patient Status:  Emergency   Age/Gender 19year old adult MRN [id number]   Location [address] [facility] Attending James Hopkins MD   Hosp Day # 0 PCP Lino Knox MD       Anesthesia Post-o

## 2021-10-12 NOTE — PROGRESS NOTES
PSYCH CONSULT    Date of Admission: 10/11/21  Date of Consult: 10/12/21  Reason for Consultation: Self-injury    Impression:  Primary Psychiatric Diagnosis:  Severe OCD with compulsions to pick at his chest wound.  On 10/10/21, he felt compelled to use a ra

## 2021-10-12 NOTE — PLAN OF CARE
Please refer to admission note.    Problem: Patient/Family Goals  Goal: Patient/Family Long Term Goal  Description: Patient's Long Term Goal: Discharge home     Interventions:  -  Provide Patient safety   - Wound healing   - See additional Care Plan goals f Progressing     Problem: DISCHARGE PLANNING  Goal: Discharge to home or other facility with appropriate resources  Description: INTERVENTIONS:  - Identify barriers to discharge w/pt and caregiver  - Include patient/family/discharge partner in discharge girma ZULEMA  Outcome: Progressing  10/12/2021 0026 by Diane Fregoso  Outcome: Progressing     Problem: SELF HARM  Goal: Patient will be protected from self-harm  Description: INTERVENTIONS:  - Initiate Suicide Precautions, including constant direct observat

## 2021-10-12 NOTE — PROGRESS NOTES
[facility]     Hospitalist Progress Note     Acampo Sales Patient Status:  Observation    2000 MRN [id number]   [facility] 3NE-A Attending Debbie Elizondo MD   Hosp Day # 0 PCP Geovanny Gee MD     Chief Complaint:  Wo topiramate  100 mg Oral BID   • ampicillin-sulbactam  3 g Intravenous Q8H   • clomiPRAMINE  50 mg Oral Nightly   • enoxaparin  0.5 mg/kg Subcutaneous Daily   • buPROPion  150 mg Oral Daily   • buPROPion  300 mg Oral Daily   • clonazePAM  2 mg Oral BID   •

## 2021-10-12 NOTE — ANESTHESIA PROCEDURE NOTES
Airway  Date/Time: 10/11/2021 8:25 PM  Urgency: elective      General Information and Staff    Patient location during procedure: OR  Anesthesiologist: Shira Galloway MD  Performed: anesthesiologist     Indications and Patient Condition  Indications for a

## 2021-10-12 NOTE — CONSULTS
INFECTIOUS DISEASE [address] [facility] Patient Status:  Observation    2000 MRN [id number]   [facility] 3NE-A Attending Felicia Alston MD   Hosp Day # 0 [facility]  [facility] that he has never smoked. He has never used smokeless tobacco. He reports that he does not drink alcohol and does not use drugs.       Allergies:    Nuts                    ANAPHYLAXIS    Comment:Tree nuts  Peanuts                 ANAPHYLAXIS  Seasonal encounter.   clomiPRAMINE (ANAFRANIL) 25 MG Oral Cap, Starting 10/7: take one cap every night for one week, then increase to two caps(50mg) every night for one week., Disp: 21 capsule, Rfl: 0  clonazePAM 2 MG Oral Tab, Take 1 tablet (2 mg total) by mouth 2 0  VENTOLIN  (90 BASE) MCG/ACT IN AERS, 2 puffs every 4 hours PRN asthma, Disp: , Rfl:         Review of Systems:    A comprehensive 10 point review of systems was completed. Pertinent positives and negatives noted in the the HPI.       Physical Exa without aura and with status migrainosus     Obsessive-compulsive disorder     Anemia     Excoriation (skin-picking) disorder     Obesity     Sprain of left rotator cuff capsule, initial encounter     Hypokalemia     Abdominal wall cellulitis     Depressio

## 2021-10-12 NOTE — PROGRESS NOTES
[facility]  Progress Note    Megan Knee Patient Status:  Observation    2000 MRN [id number]   [facility] 3NE-A Attending Edilia Holcomb MD   Hosp Day # 0 PCP Wei Edward MD     Subjective:   The patient reports improveme -[place] Daly PA-C  10/12/2021  11:03 AM

## 2021-10-12 NOTE — ANESTHESIA PREPROCEDURE EVALUATION
PRE-OP EVALUATION    Patient Name: Bhavesh Fermin    Admit Diagnosis: Laceration of chest wall, right, initial encounter [S21.111A]    Pre-op Diagnosis: Abscess of chest wall [L02.213]    INCISION AND DRAINAGE  right chest wall right    Anesthesia Procedu with 300 mg tablet for total of 450 daily, Disp: , Rfl:   buPROPion 300 MG Oral Tablet 24 Hr, Take 300 mg by mouth daily. Take with 150 mg tablet for total of 450 daily, Disp: , Rfl:   lamoTRIgine 100 MG Oral Tab, Take 100 mg by mouth 2 (two) times daily. , Depression, major, severe recurrence (HCC)     Generalized anxiety disorder     Suicide risk     ADHD (attention deficit hyperactivity disorder), inattentive type     Sprain of calcaneofibular (ligament) of ankle     Chondromalacia of patella     Pain in j Cardiovascular    Cardiovascular exam normal.         Dental    No notable dental history.          Pulmonary    Pulmonary exam normal.                 Other findings            ASA: 3   Plan: general  NPO status verified and     Post-procedure pain managem

## 2021-10-13 PROCEDURE — 99232 SBSQ HOSP IP/OBS MODERATE 35: CPT | Performed by: HOSPITALIST

## 2021-10-13 PROCEDURE — 99225 SUBSEQUENT OBSERVATION CARE: CPT | Performed by: OTHER

## 2021-10-13 NOTE — PROGRESS NOTES
[facility]                INFECTIOUS DISEASE PROGRESS NOTE    Sujatajulio Goodwin Patient Status:  Observation    2000 MRN [id number]   [facility] 3NE-A Attending Luis Albert MD   Hosp Day # 0 PCP Caitlin Multani MD     Antib [facility] Encounter on 10/11/21   1.  Aerobic Bacterial Culture     Status: Abnormal (Preliminary result)    Collection Time: 10/11/21  4:52 PM    Specimen: Chest; Other   Result Value Ref Range    Aerobic Culture Result 4+ growth Stap

## 2021-10-13 NOTE — PROGRESS NOTES
[facility]                INFECTIOUS DISEASE PROGRESS NOTE    Donna Hill Patient Status:  Observation    2000 MRN [id number]   [facility] 3NE-A Attending Lanie Stauffer MD   Hosp Day # 0 PCP Phan Way MD     Antib [address] Encounter on 10/11/21   1.  Aerobic Bacterial Culture     Status: Abnormal (Preliminary result)    Collection Time: 10/11/21  4:52 PM    Specimen: Chest; Other   Result Value Ref Range    Aerobic Culture Result 4+ growth Stap

## 2021-10-13 NOTE — PLAN OF CARE
Patient is a&ox4.  and RA. Tele and NSR. IV Unasyn per MAR. Reports mild discomfort to right chest and abdomen wall but denies need for pain medications. Sitter discontinued. Plan for voluntarily admit to psych inpatient once C&S result.  Dressing change Implement neutropenic guidelines  Outcome: Progressing     Problem: DISCHARGE PLANNING  Goal: Discharge to home or other facility with appropriate resources  Description: INTERVENTIONS:  - Identify barriers to discharge w/pt and caregiver  - Include patien observation by a care companion, sitter or RN  - Initiate consults as appropriate with Behavioral Health, Spiritual Care  - Evaluate care setting prior to admitting patient removing all contraband  - Remove all personal property per policy  Outcome: Progre

## 2021-10-13 NOTE — PROGRESS NOTES
[facility]  Report of Psychiatric Progress Note    Valma Esters Patient Status:  Observation    2000 MRN [id number]   [facility] 3NE-A Attending Judith Zhou MD   Hosp Day # 2 PCP Lino Knox MD     Date of Admission: 1 his mood and thoughts preceding  his self-injury. We discussed harm reduction and using something else for the oral fixation, ie chewing on beef jerky instead. Discussed plan above with patient and his mother.      Sophie Cabral MD    History of Present cutting and eating of skin/tissue was a way of expressing his dislike of being born female, he says \"no, I don't think so. \" The cutting wasn't a suicide attempt and he denies cutting to punish himself. \"I don't know why I do it,\" he said.      Currently Developmental History: He lives with his mother. He has an associates degree. 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 He was on the probationary period and was not allowed to stay on the job.      Past Medical History:   Diagnosis D 100 mg, Oral, BID  •  Ampicillin-Sulbactam Sodium (UNASYN) 3 g in sodium chloride 0.9% 100 mL IVPB-ADDV, 3 g, Intravenous, Q8H  •  clomiPRAMINE (ANAFRANIL) cap 50 mg, 50 mg, Oral, Nightly  •  0.9% NaCl infusion, , Intravenous, Continuous  •  enoxaparin (LO skin  Perceptions: no hallucinations  Associations: Intact    Orientation: Oriented person, place, time, situation  Attention and Concentration: fair  Memory:  intact remote  Language: Intact naming and repetition  Fund of Knowledge: Able to recite name of

## 2021-10-13 NOTE — PROGRESS NOTES
[facility]     Hospitalist Progress Note     Sam Goodwin Patient Status:  Observation    2000 MRN [id number]   [facility] 3NE-A Attending Michael Middleton MD   Hosp Day # 0 PCP Caitlin Multani MD     Chief Complaint:  Wo 30 mg Oral Daily   • gabapentin  100 mg Oral BID   • topiramate  100 mg Oral BID   • ampicillin-sulbactam  3 g Intravenous Q8H   • clomiPRAMINE  50 mg Oral Nightly   • enoxaparin  0.5 mg/kg Subcutaneous Daily   • buPROPion  150 mg Oral Daily   • buPROPion

## 2021-10-13 NOTE — PLAN OF CARE
Patient alert and oriented x4. Up as tolerated. NSR on tele. VSS. Maintaining o2 sats on RA. Wound dressing c/d/i. Daily dressing changes. C/o of pain to right upper chest, prn norco given with some relief. IV abx given.  Updated patient on POC, verbalized guidelines  Outcome: Progressing     Problem: DISCHARGE PLANNING  Goal: Discharge to home or other facility with appropriate resources  Description: INTERVENTIONS:  - Identify barriers to discharge w/pt and caregiver  - Include patient/family/discharge par , sitter or RN  - Initiate consults as appropriate with Behavioral Health, Spiritual Care  - Evaluate care setting prior to admitting patient removing all contraband  - Remove all personal property per policy  Outcome: Progressing 0

## 2021-10-13 NOTE — CM/SW NOTE
MSW, Charge Rn and RN discussed patient's post d/c needs in care rounds. No identified needs at this time, MSW will remain available should needs change.       DC Plan: Possible Voluntary admission to [facility]

## 2021-10-13 NOTE — PROGRESS NOTES
[facility]  Progress Note    Ami Richard Patient Status:  Observation    2000 MRN [id number]   [facility] 3NE-A Attending John Valera MD   Hosp Day # 0 PCP Shannon Duran MD     Subjective:   The patient is resting quietly wall wound     1. Start twice daily wet-to-dry dressing changes  2. Okay to discharge from surgical standpoint  3. Follow-up in 1 to 2 weeks in the office     This note was initiated by Amanuel Lang PA-C. The PA saw the patient in conjunction with me.  Owen Joseph

## 2021-10-14 VITALS
OXYGEN SATURATION: 98 % | BODY MASS INDEX: 41.75 KG/M2 | SYSTOLIC BLOOD PRESSURE: 104 MMHG | DIASTOLIC BLOOD PRESSURE: 56 MMHG | RESPIRATION RATE: 18 BRPM | WEIGHT: 315 LBS | TEMPERATURE: 98 F | HEART RATE: 64 BPM | HEIGHT: 73 IN

## 2021-10-14 PROCEDURE — 99239 HOSP IP/OBS DSCHRG MGMT >30: CPT | Performed by: HOSPITALIST

## 2021-10-14 PROCEDURE — 99232 SBSQ HOSP IP/OBS MODERATE 35: CPT | Performed by: OTHER

## 2021-10-14 RX ORDER — OMEPRAZOLE 20 MG/1
20 CAPSULE, DELAYED RELEASE ORAL EVERY MORNING
COMMUNITY
Start: 2021-10-05

## 2021-10-14 RX ORDER — CHLORHEXIDINE GLUCONATE 213 G/1000ML
30 SOLUTION TOPICAL
Qty: 1 EACH | Refills: 5 | Status: SHIPPED | OUTPATIENT
Start: 2021-10-15 | End: 2021-11-14

## 2021-10-14 RX ORDER — VANCOMYCIN HYDROCHLORIDE
15 EVERY 12 HOURS SCHEDULED
Status: DISCONTINUED | OUTPATIENT
Start: 2021-10-14 | End: 2021-10-14

## 2021-10-14 RX ORDER — SULFAMETHOXAZOLE AND TRIMETHOPRIM 800; 160 MG/1; MG/1
2 TABLET ORAL 2 TIMES DAILY
Qty: 56 TABLET | Refills: 0 | Status: SHIPPED | OUTPATIENT
Start: 2021-10-14 | End: 2021-10-28

## 2021-10-14 RX ORDER — BUPROPION HYDROCHLORIDE 150 MG/1
150 TABLET ORAL EVERY MORNING
COMMUNITY
Start: 2021-08-25

## 2021-10-14 RX ORDER — TOPIRAMATE 100 MG/1
100 TABLET, FILM COATED ORAL 2 TIMES DAILY
COMMUNITY
Start: 2021-09-10

## 2021-10-14 RX ORDER — GALCANEZUMAB 120 MG/ML
120 INJECTION, SOLUTION SUBCUTANEOUS
COMMUNITY
Start: 2021-09-10

## 2021-10-14 RX ORDER — CLOMIPRAMINE HYDROCHLORIDE 50 MG/1
50 CAPSULE ORAL NIGHTLY
Qty: 30 CAPSULE | Refills: 0 | Status: SHIPPED | OUTPATIENT
Start: 2021-10-14

## 2021-10-14 RX ORDER — GABAPENTIN 100 MG/1
100 CAPSULE ORAL 2 TIMES DAILY
COMMUNITY
Start: 2021-10-04

## 2021-10-14 NOTE — PROGRESS NOTES
[facility]  Progress Note    Randell Lopez Patient Status:  Inpatient    2000 MRN [id number]   [facility] 3NE-A Attending Kahlil Martinez MD   Hosp Day # 1 PCP Tiny Randhawa MD     Subjective: The patient is resting in bed.  H rotator cuff capsule, initial encounter     Hypokalemia     Abdominal wall cellulitis     Depression     Hemorrhage from open wound of right chest wall     Hemorrhage from open wound of right chest wall, initial encounter     Cellulitis of chest wall     L

## 2021-10-14 NOTE — PLAN OF CARE
Pt. A&O x4, on RA, tele shows NSR. VSS. Up ad zaira. IV KVO, Unasyn Q8. Dressing on chest c/d/i, dressing changes daily. Fall and safety precautions in place. Will continue to monitor.      Problem: Patient/Family Goals  Goal: Patient/Family Long Term Goal  D appropriate resources  Description: INTERVENTIONS:  - Identify barriers to discharge w/pt and caregiver  - Include patient/family/discharge partner in discharge planning  - Arrange for needed discharge resources and transportation as appropriate  - Identif setting prior to admitting patient removing all contraband  - Remove all personal property per policy  Outcome: Progressing

## 2021-10-14 NOTE — PROGRESS NOTES
[facility]                INFECTIOUS DISEASE PROGRESS NOTE    Jamesmarissa Mooreond Patient Status:  Observation    2000 MRN [id number]   [facility] 3NE-A Attending Marcella Velasquez MD   Hosp Day # 1 PCP Ramya Ferris MD     Antib [facility] Encounter on 10/11/21   1.  Aerobic Bacterial Culture     Status: Abnormal    Collection Time: 10/11/21  4:52 PM    Specimen: Chest; Other   Result Value Ref Range    Aerobic Culture Result 4+ growth Staphylococcus aureus, MR mupicrocin    2.  Depression, transfer to inpatient when bed available per psych         Juan Deluca MD, MD  [facility]  [phone]

## 2021-10-14 NOTE — PROGRESS NOTES
[facility]  Report of Psychiatric Progress Note    Jovita Aj Patient Status:  Observation    2000 MRN [id number]   [facility] 3NE-A Attending Chris De Leon MD   Hosp Day # 3 PCP Constance Maza MD     Date of Admission: 1 Yair Silver, [address] [facility] 6. Baljeet Marquez [facility] 87  [phone]    Start a virtual partial hospitalization program. If not available, start a virtual intensive outpatient program. This is strongly recommended.      2) A clinic option:  As from the picking with a decrease in anxiety, but then feels guilty and unworthy, making him feel more anxious and depressed. He then picks more causing the wound infection. He is not conscious of any intrusive thoughts leading him to pick.  He just feels co skin/tissue. Per mom, no are new stressors in his life. He was in the [facility] program when this happened.      10/14/21- At age 4, he was already picking his skin and causes scabs. He would then eat the scabs to self-soothe and decreased his anxiety.  Over t hyperactivity disorder (ADHD)    • Blastomycosis    • Chronic rhinitis    • Depression    • Extrinsic asthma, unspecified    • Migraines    • Neuropathic pain    • Neuropathy    • OTHER DISEASES 2008    blastomycosis  treated  ([facility])   • OTHER DISEASES 2 **OR** HYDROcodone-acetaminophen (NORCO) 5-325 MG per tab 1 tablet, 1 tablet, Oral, Q4H PRN **OR** HYDROcodone-acetaminophen (NORCO) 5-325 MG per tab 2 tablet, 2 tablet, Oral, Q4H PRN  •  ondansetron (ZOFRAN) injection 4 mg, 4 mg, Intravenous, Q6H PRN  •

## 2021-10-14 NOTE — PROGRESS NOTES
[facility]     Hospitalist Progress Note     Mendel Dubois Patient Status:  Observation    2000 MRN [id number]   [facility] 3NE-A Attending Ingrid Joya MD   Hosp Day # 1 PCP Merlin Marroquin MD     Chief Complaint:  Wo vancomycin  15 mg/kg (Adjusted) Intravenous 2 times per day   • DULoxetine  30 mg Oral Daily   • gabapentin  100 mg Oral BID   • topiramate  100 mg Oral BID   • clomiPRAMINE  50 mg Oral Nightly   • enoxaparin  0.5 mg/kg Subcutaneous Daily   • buPROPion  15

## 2021-10-15 NOTE — DISCHARGE SUMMARY
[facility]  DISCHARGE SUMMARY     Mendel Dubois Patient Status:  Inpatient    2000 MRN [id number]   [facility] 3NE-A Attending No att. providers found   Hosp Day # 1 PCP Merlin Marroquin MD     Date of Admission: 10/11/2021 Surgery    Discharge Medication List:     Discharge Medications      START taking these medications      Instructions Prescription details   Hibiclens 4 % Liqd  Generic drug: Chlorhexidine Gluconate      Apply 30 mL topically Every Monday, Wednesday, and F NEURONTIN      Take 100 mg by mouth 2 (two) times a day. Refills: 0     lamoTRIgine 100 MG Tabs  Commonly known as: LAMICTAL      Take 100 mg by mouth 2 (two) times daily.    Refills: 0     Lisdexamfetamine Dimesylate 60 MG Caps  Commonly known as: VYVANS Assistant    Appointments for Next 30 Days 10/15/2021 - 11/14/2021            None          Vital signs:       Physical Exam:    General: No acute distress. Respiratory: Clear to auscultation bilaterally. No wheezes. No rhonchi.   Cardiovascular: S1, S2.

## 2021-10-15 NOTE — PROGRESS NOTES
NURSING DISCHARGE NOTE    Discharged Home via Ambulatory. Accompanied by RN  Belongings Taken by patient/family. IV site discontinued. Discharge instructions, follow up, and medications discussed with patient.  Wound care perform with teach back by

## 2021-10-15 NOTE — PAYOR COMM NOTE
--------------  DISCHARGE REVIEW    Payor: [employer] #:  [id number]  Authorization Number: [id number]    Admit date: 10/13/21  Admit time:   2:20 PM  Discharge Date: 10/14/2021  8:05 PM     Admitting Physician: DO Sandra Orellana recommended for transfer to [facility]. However, patient was MRSA positive and would require isolation room and thus would unlikely to be benefit from recommended therapies.   Thus patient will follow-up as an outpatient has been cleared for discharge fang Take 300 mg by mouth daily. Take with 150 mg tablet for total of 450 daily   Refills: 0     buPROPion 150 MG Tb24  Commonly known as: WELLBUTRIN XL      Take 150 mg by mouth every morning.  Along with the 300 mg for a total of 450 mg daily   Refills: 0 [facility] [place], [place] - [email or web address] [phone], [phone] [email or web address] [address]    Phone: [phone]   · Hibiclens 4 % Liqd  · mupirocin 2 % Oint  · sulfamethoxazole-trimethoprim -160 MG Tabs per tablet     Pl   Erythromycin >=8 Resistant         Gentamicin <=0.5 Sensitive         Levofloxacin >=8 Resistant         Oxacillin >=4 Resistant         Tetracycline <=1 Sensitive         Trimethoprim/Sulfa <=10 Sensitive         Vancomycin 1 Sensitive            ASSESS twice a day, Klonipin 2mg twice a day, and Adderall 10mg twice a day.     6) Agree with your plan of locking up sharp objects in the house. IF you feel a strong urge to cut and self-mutilate, you need to talk to your mother and go to the ED.  IF you injure
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
## 2021-10-19 ENCOUNTER — OFFICE VISIT (OUTPATIENT)
Dept: SURGERY | Facility: CLINIC | Age: 21
End: 2021-10-19

## 2021-10-19 VITALS — HEIGHT: 73 IN | WEIGHT: 315 LBS | TEMPERATURE: 97 F | BODY MASS INDEX: 41.75 KG/M2

## 2021-10-19 DIAGNOSIS — L03.313 CELLULITIS OF CHEST WALL: Primary | ICD-10-CM

## 2021-10-19 PROCEDURE — 99024 POSTOP FOLLOW-UP VISIT: CPT | Performed by: PHYSICIAN ASSISTANT

## 2021-10-19 PROCEDURE — 3008F BODY MASS INDEX DOCD: CPT | Performed by: PHYSICIAN ASSISTANT

## 2021-10-19 NOTE — PROGRESS NOTES
Post Operative Visit Note       Active Problems  1.  Cellulitis of chest wall         Chief Complaint   Patient presents with:  Post-Op:  PO 10/11 I &D right chest wall-  pt states has paoin and drainage, no fever, chills n/v , normal bm         History of • Stroke Maternal Grandfather    • Alcohol and Other Disorders Associated Maternal Grandfather    • No Known Problems Mother    • Allergies Sister    • Allergies Brother      Social History    Socioeconomic History      Marital status: Single    Tobacco Dimesylate 60 MG Oral Cap, Take 60 mg by mouth every morning., Disp: , Rfl:   •  propranolol 20 MG Oral Tab, Take 20 mg by mouth 2 (two) times daily. , Disp: , Rfl:   •  QUEtiapine 50 MG Oral Tab, Take 75 mg by mouth nightly.  Take one and 1/2 tabs nightly, and atraumatic. Cardiovascular:      Rate and Rhythm: Normal rate and regular rhythm. Pulmonary:      Effort: Pulmonary effort is normal.      Breath sounds: Normal breath sounds.    Abdominal:      General: Bowel sounds are normal. There is no distensi

## 2021-10-20 PROBLEM — S21.111A: Status: RESOLVED | Noted: 2021-10-11 | Resolved: 2021-10-20

## 2021-10-20 PROBLEM — S21.119A LACERATION OF CHEST WALL: Status: RESOLVED | Noted: 2021-10-11 | Resolved: 2021-10-20

## 2021-10-20 PROBLEM — S21.101A: Status: RESOLVED | Noted: 2021-09-16 | Resolved: 2021-10-20

## 2021-10-20 PROBLEM — L03.311 ABDOMINAL WALL CELLULITIS: Status: RESOLVED | Noted: 2019-05-06 | Resolved: 2021-10-20

## 2021-10-20 PROBLEM — S21.101A: Status: RESOLVED | Noted: 2021-09-15 | Resolved: 2021-10-20

## 2021-10-20 PROBLEM — E87.6 HYPOKALEMIA: Status: RESOLVED | Noted: 2019-05-06 | Resolved: 2021-10-20

## 2021-10-26 ENCOUNTER — OFFICE VISIT (OUTPATIENT)
Dept: SURGERY | Facility: CLINIC | Age: 21
End: 2021-10-26

## 2021-10-26 VITALS — BODY MASS INDEX: 41.75 KG/M2 | WEIGHT: 315 LBS | TEMPERATURE: 97 F | HEIGHT: 73 IN

## 2021-10-26 DIAGNOSIS — L03.313 CELLULITIS OF CHEST WALL: Primary | ICD-10-CM

## 2021-10-26 PROCEDURE — 99024 POSTOP FOLLOW-UP VISIT: CPT | Performed by: PHYSICIAN ASSISTANT

## 2021-10-26 PROCEDURE — 3008F BODY MASS INDEX DOCD: CPT | Performed by: PHYSICIAN ASSISTANT

## 2021-10-26 NOTE — PROGRESS NOTES
Post Operative Visit Note       Active Problems  1. Cellulitis of chest wall         Chief Complaint   Patient presents with:  Post-Op: PO 1wk f.u-INCISION AND DRAINAGE  right chest wall right- PT denies concerns states is healing well.          History of Other Disorders Associated Maternal Grandfather    • No Known Problems Mother    • Allergies Sister    • Allergies Brother      Social History    Socioeconomic History      Marital status: Single    Tobacco Use      Smoking status: Never Smoker      Smokel Thursday, Disp: , Rfl:   •  Rimegepant Sulfate (NURTEC) 75 MG Oral Tablet Dispersible, Take 75 mg by mouth as needed. , Disp: , Rfl:   •  EPINEPHrine 0.3 MG/0.3ML Injection Solution Auto-injector, PRN anaphylaxis, Disp: 2 each, Rfl: 0  •  VENTOLIN  ( Skin:     General: Skin is warm and dry. Comments: Wound to the right anterior chest wall has almost completely epithelialized. There is no surrounding erythema, induration or fluctuance. There is no drainage.    Neurological:      Mental Status: H

## 2021-11-04 PROBLEM — L03.313 CELLULITIS OF CHEST WALL: Status: RESOLVED | Noted: 2021-09-16 | Resolved: 2021-11-04

## 2023-02-01 ENCOUNTER — APPOINTMENT (OUTPATIENT)
Dept: CT IMAGING | Facility: HOSPITAL | Age: 23
End: 2023-02-01
Attending: EMERGENCY MEDICINE
Payer: COMMERCIAL

## 2023-02-01 ENCOUNTER — HOSPITAL ENCOUNTER (EMERGENCY)
Facility: HOSPITAL | Age: 23
Discharge: HOME | End: 2023-02-01
Attending: EMERGENCY MEDICINE
Payer: COMMERCIAL

## 2023-02-01 VITALS
HEART RATE: 80 BPM | RESPIRATION RATE: 16 BRPM | BODY MASS INDEX: 41.75 KG/M2 | HEIGHT: 73 IN | OXYGEN SATURATION: 100 % | WEIGHT: 315 LBS | DIASTOLIC BLOOD PRESSURE: 68 MMHG | SYSTOLIC BLOOD PRESSURE: 125 MMHG | TEMPERATURE: 98 F

## 2023-02-01 DIAGNOSIS — G43.809 OTHER MIGRAINE WITHOUT STATUS MIGRAINOSUS, NOT INTRACTABLE: Primary | ICD-10-CM

## 2023-02-01 PROCEDURE — 70450 CT HEAD/BRAIN W/O DYE: CPT | Performed by: EMERGENCY MEDICINE

## 2023-02-01 PROCEDURE — 96375 TX/PRO/DX INJ NEW DRUG ADDON: CPT

## 2023-02-01 PROCEDURE — 96374 THER/PROPH/DIAG INJ IV PUSH: CPT

## 2023-02-01 PROCEDURE — 99285 EMERGENCY DEPT VISIT HI MDM: CPT

## 2023-02-01 PROCEDURE — 96361 HYDRATE IV INFUSION ADD-ON: CPT

## 2023-02-01 PROCEDURE — 96376 TX/PRO/DX INJ SAME DRUG ADON: CPT

## 2023-02-01 RX ORDER — DIPHENHYDRAMINE HYDROCHLORIDE 50 MG/ML
25 INJECTION INTRAMUSCULAR; INTRAVENOUS ONCE
Status: COMPLETED | OUTPATIENT
Start: 2023-02-01 | End: 2023-02-01

## 2023-02-01 RX ORDER — LORAZEPAM 2 MG/ML
1 INJECTION INTRAMUSCULAR ONCE
Status: COMPLETED | OUTPATIENT
Start: 2023-02-01 | End: 2023-02-01

## 2023-02-01 RX ORDER — DEXAMETHASONE 4 MG/1
4 TABLET ORAL ONCE
Status: COMPLETED | OUTPATIENT
Start: 2023-02-01 | End: 2023-02-01

## 2023-02-01 RX ORDER — METOCLOPRAMIDE HYDROCHLORIDE 5 MG/ML
5 INJECTION INTRAMUSCULAR; INTRAVENOUS ONCE
Status: COMPLETED | OUTPATIENT
Start: 2023-02-01 | End: 2023-02-01

## 2023-02-01 RX ORDER — KETOROLAC TROMETHAMINE 15 MG/ML
15 INJECTION, SOLUTION INTRAMUSCULAR; INTRAVENOUS ONCE
Status: COMPLETED | OUTPATIENT
Start: 2023-02-01 | End: 2023-02-01

## 2023-02-01 NOTE — DISCHARGE INSTRUCTIONS
Please try to go home and sleep. Hopefully this will break the headache cycle.   If symptoms worsen return to the emergency department

## 2023-02-16 ENCOUNTER — HOSPITAL ENCOUNTER (EMERGENCY)
Facility: HOSPITAL | Age: 23
Discharge: HOME | End: 2023-02-16
Attending: STUDENT IN AN ORGANIZED HEALTH CARE EDUCATION/TRAINING PROGRAM
Payer: COMMERCIAL

## 2023-02-16 ENCOUNTER — APPOINTMENT (OUTPATIENT)
Dept: CT IMAGING | Facility: HOSPITAL | Age: 23
End: 2023-02-16
Attending: STUDENT IN AN ORGANIZED HEALTH CARE EDUCATION/TRAINING PROGRAM
Payer: COMMERCIAL

## 2023-02-16 VITALS
BODY MASS INDEX: 41.75 KG/M2 | WEIGHT: 315 LBS | HEIGHT: 73 IN | RESPIRATION RATE: 16 BRPM | DIASTOLIC BLOOD PRESSURE: 74 MMHG | HEART RATE: 103 BPM | SYSTOLIC BLOOD PRESSURE: 115 MMHG | OXYGEN SATURATION: 97 % | TEMPERATURE: 98 F

## 2023-02-16 DIAGNOSIS — R10.9 ABDOMINAL PAIN, ACUTE: Primary | ICD-10-CM

## 2023-02-16 DIAGNOSIS — K59.00 CONSTIPATION, UNSPECIFIED CONSTIPATION TYPE: ICD-10-CM

## 2023-02-16 LAB
ALBUMIN SERPL-MCNC: 3.9 G/DL (ref 3.4–5)
ALBUMIN/GLOB SERPL: 0.9 {RATIO} (ref 1–2)
ALP LIVER SERPL-CCNC: 106 U/L
ALT SERPL-CCNC: 19 U/L
ANION GAP SERPL CALC-SCNC: 9 MMOL/L (ref 0–18)
AST SERPL-CCNC: 14 U/L (ref 15–37)
BASOPHILS # BLD AUTO: 0.03 X10(3) UL (ref 0–0.2)
BASOPHILS NFR BLD AUTO: 0.3 %
BILIRUB SERPL-MCNC: 0.6 MG/DL (ref 0.1–2)
BUN BLD-MCNC: 17 MG/DL (ref 7–18)
CALCIUM BLD-MCNC: 9.6 MG/DL (ref 8.5–10.1)
CHLORIDE SERPL-SCNC: 110 MMOL/L (ref 98–112)
CO2 SERPL-SCNC: 20 MMOL/L (ref 21–32)
CREAT BLD-MCNC: 1.11 MG/DL
EOSINOPHIL # BLD AUTO: 0.13 X10(3) UL (ref 0–0.7)
EOSINOPHIL NFR BLD AUTO: 1.3 %
ERYTHROCYTE [DISTWIDTH] IN BLOOD BY AUTOMATED COUNT: 12.8 %
GFR SERPLBLD BASED ON 1.73 SQ M-ARVRAT: 95 ML/MIN/1.73M2 (ref 60–?)
GLOBULIN PLAS-MCNC: 4.3 G/DL (ref 2.8–4.4)
GLUCOSE BLD-MCNC: 105 MG/DL (ref 70–99)
HCT VFR BLD AUTO: 51.7 %
HGB BLD-MCNC: 16.7 G/DL
IMM GRANULOCYTES # BLD AUTO: 0.04 X10(3) UL (ref 0–1)
IMM GRANULOCYTES NFR BLD: 0.4 %
LIPASE SERPL-CCNC: 21 U/L (ref 13–75)
LIPASE SERPL-CCNC: 68 U/L (ref 73–393)
LYMPHOCYTES # BLD AUTO: 0.54 X10(3) UL (ref 1–4)
LYMPHOCYTES NFR BLD AUTO: 5.4 %
MCH RBC QN AUTO: 28.4 PG (ref 26–34)
MCHC RBC AUTO-ENTMCNC: 32.3 G/DL (ref 31–37)
MCV RBC AUTO: 87.8 FL
MONOCYTES # BLD AUTO: 0.36 X10(3) UL (ref 0.1–1)
MONOCYTES NFR BLD AUTO: 3.6 %
NEUTROPHILS # BLD AUTO: 8.83 X10 (3) UL (ref 1.5–7.7)
NEUTROPHILS # BLD AUTO: 8.83 X10(3) UL (ref 1.5–7.7)
NEUTROPHILS NFR BLD AUTO: 89 %
OSMOLALITY SERPL CALC.SUM OF ELEC: 290 MOSM/KG (ref 275–295)
PLATELET # BLD AUTO: 277 10(3)UL (ref 150–450)
POTASSIUM SERPL-SCNC: 3.8 MMOL/L (ref 3.5–5.1)
PROT SERPL-MCNC: 8.2 G/DL (ref 6.4–8.2)
RBC # BLD AUTO: 5.89 X10(6)UL
SODIUM SERPL-SCNC: 139 MMOL/L (ref 136–145)
WBC # BLD AUTO: 9.9 X10(3) UL (ref 4–11)

## 2023-02-16 PROCEDURE — 74177 CT ABD & PELVIS W/CONTRAST: CPT | Performed by: STUDENT IN AN ORGANIZED HEALTH CARE EDUCATION/TRAINING PROGRAM

## 2023-02-16 PROCEDURE — 85025 COMPLETE CBC W/AUTO DIFF WBC: CPT | Performed by: STUDENT IN AN ORGANIZED HEALTH CARE EDUCATION/TRAINING PROGRAM

## 2023-02-16 PROCEDURE — 96375 TX/PRO/DX INJ NEW DRUG ADDON: CPT

## 2023-02-16 PROCEDURE — 96374 THER/PROPH/DIAG INJ IV PUSH: CPT

## 2023-02-16 PROCEDURE — 96361 HYDRATE IV INFUSION ADD-ON: CPT

## 2023-02-16 PROCEDURE — 83690 ASSAY OF LIPASE: CPT | Performed by: STUDENT IN AN ORGANIZED HEALTH CARE EDUCATION/TRAINING PROGRAM

## 2023-02-16 PROCEDURE — 99285 EMERGENCY DEPT VISIT HI MDM: CPT

## 2023-02-16 PROCEDURE — 80053 COMPREHEN METABOLIC PANEL: CPT | Performed by: STUDENT IN AN ORGANIZED HEALTH CARE EDUCATION/TRAINING PROGRAM

## 2023-02-16 RX ORDER — ONDANSETRON 4 MG/1
4 TABLET, ORALLY DISINTEGRATING ORAL EVERY 4 HOURS PRN
Qty: 10 TABLET | Refills: 0 | Status: SHIPPED | OUTPATIENT
Start: 2023-02-16 | End: 2023-02-23

## 2023-02-16 RX ORDER — MORPHINE SULFATE 4 MG/ML
4 INJECTION, SOLUTION INTRAMUSCULAR; INTRAVENOUS ONCE
Status: COMPLETED | OUTPATIENT
Start: 2023-02-16 | End: 2023-02-16

## 2023-02-16 RX ORDER — POLYETHYLENE GLYCOL 3350 17 G/17G
17 POWDER, FOR SOLUTION ORAL DAILY PRN
Qty: 12 EACH | Refills: 0 | Status: SHIPPED | OUTPATIENT
Start: 2023-02-16 | End: 2023-03-18

## 2023-02-16 RX ORDER — ONDANSETRON 2 MG/ML
4 INJECTION INTRAMUSCULAR; INTRAVENOUS ONCE
Status: COMPLETED | OUTPATIENT
Start: 2023-02-16 | End: 2023-02-16

## 2023-02-16 RX ORDER — DOCUSATE SODIUM 100 MG/1
100 CAPSULE, LIQUID FILLED ORAL 2 TIMES DAILY
Qty: 60 CAPSULE | Refills: 0 | Status: SHIPPED | OUTPATIENT
Start: 2023-02-16 | End: 2023-03-18

## 2023-02-17 NOTE — DISCHARGE INSTRUCTIONS
No particular constipation on CT but you can try the stool softener and MiraLAX if you feel you are having trouble going. No evidence of blockage or inflammation, no surgical emergency. Regular diet as tolerated.

## 2023-02-26 ENCOUNTER — HOSPITAL ENCOUNTER (EMERGENCY)
Facility: HOSPITAL | Age: 23
Discharge: NURSING, REHAB OR LONG-TERM CARE | End: 2023-02-27
Attending: STUDENT IN AN ORGANIZED HEALTH CARE EDUCATION/TRAINING PROGRAM
Payer: COMMERCIAL

## 2023-02-26 DIAGNOSIS — R45.851 SUICIDAL IDEATION: ICD-10-CM

## 2023-02-26 DIAGNOSIS — F32.A DEPRESSIVE DISORDER: Primary | ICD-10-CM

## 2023-02-26 LAB
ALBUMIN SERPL-MCNC: 3.8 G/DL (ref 3.4–5)
ALBUMIN/GLOB SERPL: 1 {RATIO} (ref 1–2)
ALP LIVER SERPL-CCNC: 100 U/L
ALT SERPL-CCNC: 19 U/L
ANION GAP SERPL CALC-SCNC: 8 MMOL/L (ref 0–18)
AST SERPL-CCNC: 16 U/L (ref 15–37)
BASOPHILS # BLD AUTO: 0.09 X10(3) UL (ref 0–0.2)
BASOPHILS NFR BLD AUTO: 0.8 %
BENZODIAZ UR QL SCN: NEGATIVE
BILIRUB SERPL-MCNC: 0.3 MG/DL (ref 0.1–2)
BILIRUB UR QL STRIP.AUTO: NEGATIVE
BUN BLD-MCNC: 9 MG/DL (ref 7–18)
CALCIUM BLD-MCNC: 9.2 MG/DL (ref 8.5–10.1)
CANNABINOIDS UR QL SCN: NEGATIVE
CHLORIDE SERPL-SCNC: 111 MMOL/L (ref 98–112)
CLARITY UR REFRACT.AUTO: CLEAR
CO2 SERPL-SCNC: 22 MMOL/L (ref 21–32)
COCAINE UR QL: NEGATIVE
COLOR UR AUTO: YELLOW
CREAT BLD-MCNC: 0.95 MG/DL
CREAT UR-SCNC: 225 MG/DL
EOSINOPHIL # BLD AUTO: 0.35 X10(3) UL (ref 0–0.7)
EOSINOPHIL NFR BLD AUTO: 3.2 %
ERYTHROCYTE [DISTWIDTH] IN BLOOD BY AUTOMATED COUNT: 12.5 %
ETHANOL SERPL-MCNC: <3 MG/DL (ref ?–3)
GFR SERPLBLD BASED ON 1.73 SQ M-ARVRAT: 114 ML/MIN/1.73M2 (ref 60–?)
GLOBULIN PLAS-MCNC: 3.8 G/DL (ref 2.8–4.4)
GLUCOSE BLD-MCNC: 91 MG/DL (ref 70–99)
GLUCOSE UR STRIP.AUTO-MCNC: NEGATIVE MG/DL
HCT VFR BLD AUTO: 43.6 %
HGB BLD-MCNC: 14.5 G/DL
IMM GRANULOCYTES # BLD AUTO: 0.04 X10(3) UL (ref 0–1)
IMM GRANULOCYTES NFR BLD: 0.4 %
KETONES UR STRIP.AUTO-MCNC: NEGATIVE MG/DL
LYMPHOCYTES # BLD AUTO: 2.63 X10(3) UL (ref 1–4)
LYMPHOCYTES NFR BLD AUTO: 24.3 %
MCH RBC QN AUTO: 28.9 PG (ref 26–34)
MCHC RBC AUTO-ENTMCNC: 33.3 G/DL (ref 31–37)
MCV RBC AUTO: 87 FL
MDMA UR QL SCN: NEGATIVE
MONOCYTES # BLD AUTO: 0.61 X10(3) UL (ref 0.1–1)
MONOCYTES NFR BLD AUTO: 5.6 %
NEUTROPHILS # BLD AUTO: 7.11 X10 (3) UL (ref 1.5–7.7)
NEUTROPHILS # BLD AUTO: 7.11 X10(3) UL (ref 1.5–7.7)
NEUTROPHILS NFR BLD AUTO: 65.7 %
NITRITE UR QL STRIP.AUTO: NEGATIVE
OPIATES UR QL SCN: NEGATIVE
OSMOLALITY SERPL CALC.SUM OF ELEC: 290 MOSM/KG (ref 275–295)
OXYCODONE UR QL SCN: NEGATIVE
PH UR STRIP.AUTO: 5 [PH] (ref 5–8)
PLATELET # BLD AUTO: 338 10(3)UL (ref 150–450)
POTASSIUM SERPL-SCNC: 3.8 MMOL/L (ref 3.5–5.1)
PROT SERPL-MCNC: 7.6 G/DL (ref 6.4–8.2)
PROT UR STRIP.AUTO-MCNC: NEGATIVE MG/DL
RBC # BLD AUTO: 5.01 X10(6)UL
RBC UR QL AUTO: NEGATIVE
SARS-COV-2 RNA RESP QL NAA+PROBE: NOT DETECTED
SODIUM SERPL-SCNC: 141 MMOL/L (ref 136–145)
SP GR UR STRIP.AUTO: 1.02 (ref 1–1.03)
UROBILINOGEN UR STRIP.AUTO-MCNC: <2 MG/DL
WBC # BLD AUTO: 10.8 X10(3) UL (ref 4–11)

## 2023-02-26 PROCEDURE — 99285 EMERGENCY DEPT VISIT HI MDM: CPT

## 2023-02-26 PROCEDURE — 80325 AMPHETAMINES 3OR 4: CPT | Performed by: STUDENT IN AN ORGANIZED HEALTH CARE EDUCATION/TRAINING PROGRAM

## 2023-02-26 PROCEDURE — 87086 URINE CULTURE/COLONY COUNT: CPT | Performed by: STUDENT IN AN ORGANIZED HEALTH CARE EDUCATION/TRAINING PROGRAM

## 2023-02-26 PROCEDURE — 96374 THER/PROPH/DIAG INJ IV PUSH: CPT

## 2023-02-26 PROCEDURE — 81001 URINALYSIS AUTO W/SCOPE: CPT | Performed by: STUDENT IN AN ORGANIZED HEALTH CARE EDUCATION/TRAINING PROGRAM

## 2023-02-26 PROCEDURE — 80053 COMPREHEN METABOLIC PANEL: CPT | Performed by: STUDENT IN AN ORGANIZED HEALTH CARE EDUCATION/TRAINING PROGRAM

## 2023-02-26 PROCEDURE — 85025 COMPLETE CBC W/AUTO DIFF WBC: CPT | Performed by: STUDENT IN AN ORGANIZED HEALTH CARE EDUCATION/TRAINING PROGRAM

## 2023-02-26 PROCEDURE — 82077 ASSAY SPEC XCP UR&BREATH IA: CPT | Performed by: STUDENT IN AN ORGANIZED HEALTH CARE EDUCATION/TRAINING PROGRAM

## 2023-02-26 PROCEDURE — 80307 DRUG TEST PRSMV CHEM ANLYZR: CPT | Performed by: STUDENT IN AN ORGANIZED HEALTH CARE EDUCATION/TRAINING PROGRAM

## 2023-02-26 RX ORDER — GABAPENTIN 100 MG/1
100 CAPSULE ORAL ONCE
Status: COMPLETED | OUTPATIENT
Start: 2023-02-26 | End: 2023-02-26

## 2023-02-26 RX ORDER — CLONAZEPAM 0.5 MG/1
1.5 TABLET ORAL ONCE
Status: COMPLETED | OUTPATIENT
Start: 2023-02-26 | End: 2023-02-26

## 2023-02-26 RX ORDER — TOPIRAMATE 100 MG/1
100 TABLET, FILM COATED ORAL ONCE
Status: COMPLETED | OUTPATIENT
Start: 2023-02-26 | End: 2023-02-26

## 2023-02-26 RX ORDER — LAMOTRIGINE 100 MG/1
100 TABLET ORAL ONCE
Status: COMPLETED | OUTPATIENT
Start: 2023-02-26 | End: 2023-02-26

## 2023-02-26 RX ORDER — CLOMIPRAMINE HYDROCHLORIDE 50 MG/1
50 CAPSULE ORAL ONCE
Status: COMPLETED | OUTPATIENT
Start: 2023-02-26 | End: 2023-02-26

## 2023-02-26 RX ORDER — KETOROLAC TROMETHAMINE 15 MG/ML
15 INJECTION, SOLUTION INTRAMUSCULAR; INTRAVENOUS ONCE
Status: COMPLETED | OUTPATIENT
Start: 2023-02-26 | End: 2023-02-26

## 2023-02-26 RX ORDER — QUETIAPINE FUMARATE 25 MG/1
75 TABLET, FILM COATED ORAL ONCE
Status: COMPLETED | OUTPATIENT
Start: 2023-02-26 | End: 2023-02-26

## 2023-02-26 RX ORDER — PROPRANOLOL HYDROCHLORIDE 20 MG/1
20 TABLET ORAL ONCE
Status: COMPLETED | OUTPATIENT
Start: 2023-02-26 | End: 2023-02-26

## 2023-02-27 VITALS
WEIGHT: 315 LBS | TEMPERATURE: 98 F | HEART RATE: 73 BPM | SYSTOLIC BLOOD PRESSURE: 121 MMHG | OXYGEN SATURATION: 98 % | RESPIRATION RATE: 16 BRPM | DIASTOLIC BLOOD PRESSURE: 69 MMHG | BODY MASS INDEX: 43 KG/M2

## 2023-02-27 PROBLEM — F33.2 MDD (MAJOR DEPRESSIVE DISORDER), RECURRENT EPISODE, SEVERE (HCC): Status: ACTIVE | Noted: 2023-02-27

## 2023-02-27 RX ORDER — IBUPROFEN 600 MG/1
600 TABLET ORAL ONCE
Status: COMPLETED | OUTPATIENT
Start: 2023-02-27 | End: 2023-02-27

## 2023-02-27 NOTE — ED QUICK NOTES
Attempted to give report to [facility], unable to accept report at this time.

## 2023-02-27 NOTE — BH PROGRESS NOTE
Pt accepted to [facility] under Dr Melvin Zambrano. Bed 824-A. Dx MDD, recurrent, severe. RN to RN [place] be given [place] [id number].

## 2023-02-27 NOTE — ED QUICK NOTES
This RN introduced self to patient. No needs at this time. Dressings to wounds intact, unable to assess wounds.

## 2023-02-27 NOTE — ED QUICK NOTES
Pt requesting to take nightly meds. Pt reports he takes all of his nightly meds at once. ERMD notified, orders received.
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
## 2023-02-27 NOTE — ED NOTES
TRANSFER SUMMARY:  [facility]: Faxed packet for review. NO AVAILABLE BEDS:  [facility]): Unit is full. [facility]: Unable to accept d/t acuity on unit. [facility]: No available beds. [facility]: No available beds.
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
## 2023-02-27 NOTE — BH LEVEL OF CARE ASSESSMENT
Crisis Evaluation Assessment    Brianna Barber YOB: 2000   Age 20year old MRN [id number]   Location [address] [facility] Attending Tenzin Orr MD      Patient's legal sex: male  Patient identifies as: transgender male  Patient's birth sex: female  Preferred pronouns: he/him    Date of Service: 2/26/2023    Referral Source:  Referral Source  Referral Source: Friend/Relative  Referral Source Info: mom dropped pt off at the ER     Reason for Crisis Evaluation   Aaron Barroso is a 20 yr old transgender male who arrived to the ER via his mom d/t SIB and passive SI. He states he has a lot of stress, no therapist, \"hasn't been doing well mentally\", and has been [place] a lot of intrusive thoughts that led to me to telling myself to burn myself because I felt I wasn't good enough. \" He states he burned himself yesterday and this evening. He states he burned his left wrist, upper left arm, and left ankle. He heated up a metal tongue scraper with a lighter and put it to his skin. He states prior to yesterday, he last self harmed 7 yrs ago. He states the SIB wasn't a suicide attempt. He reports feeling unsure if he is safe to go home at this time. He states he is worried that \"it could happen again but in a different form\" and more intense. He feels he needs to go inpatient at this time. He reports his self care started to diminish, getting more distracted than usual, losing sight of priorities, getting more irritated at things that wouldn't normally bother him, and feels a heaviness in his chest.                   Collateral  None available            Risk to Self or Others  Aaron Barroso denies current thoughts or hx of harming others. He denies hx of property damage. Suicide Risk Assessments:    Source of information for CSSR: Patient  In what setting is the screener performed?: in person  1.  Have you wished you were dead or wished you could go to sleep and not wake up? (past 30 days): Yes  2. Have you actually had any thoughts of killing yourself? (past 30 days): No              6. Have you ever done anything, started to do anything, or prepared to do anything to end your life? (lifetime): Yes  7. How long ago did you do any of these?: Over a year ago  Score -  OV: 2- Low Risk   Describe : Justino Figueroa reports he has thoughts of wishing he were dead. He states those thoughts started last night. He states he noticed it in the past few weeks. He states sometimes at work he had thoughts of wishing he were dead. He states that he \"didn't take those thoughts as seriously as I should have. \" He reports not feeling safe to go home and is worried that his self harming bx \"could happen in a different form \" and be more intense. Is your experience of thoughts of dying by suicide: A Solution to a Problem  Protective Factors: He states that he plans on continuing his education and going to nursing school. He has a pet rabbit that means a lot to him and states he's a big part of his life. His father passed away from cancer when he was 4 and states he can't let another death like this happen to his immediate family, especially to his mom. Past Suicidal Ideation: Ideation;Method/Plan;Intention; Attempt  Describe: Justino Figueroa states he has a severe peanut allergy and at 12 he ate peanut butter and sat alone in the basement until he was having a reaction. He was admitted to [facility]. Non-Suicidal Self-Injury:   Justino Figueroa states he burned himself yesterday and this evening. He states he burned his left wrist, upper left arm, and left ankle. He heated up a metal tongue scraper with a lighter and put it to his skin. He states prior to yesterday, he last self harmed 7 yrs ago. He states he was diagnosed with OCD in 2021. He states he cut into his skin at that time and needed surgical intervention.  He states it wasn't a suicide attempt and states it was obsessive thoughts with a self harming component. Access to Means:  Access to Means  Has access to means to attempt suicide or harm others or property: Yes  Description of Access: household items  Access to Firearm/Weapon: No  Discussion of Removal of Firearm/Weapon: Catrachita Becerra denies access to firearms  Do you have a firearm owner ID card?: No  Collateral for any access to means/firearms/weapons: na    Protective Factors:   Protective Factors: He states that he plans on continuing his education and going to nursing school. He has a pet rabbit that means a lot to him and states he's a big part of his life. His father passed away from cancer when he was 4 and states he can't let another death like this happen to his immediate family, especially to his mom. Review of Psychiatric Systems:  Catrachita Becerra states he has broken sleep then other nights he can't sleep much. He reports trouble with sleep for about 3 weeks. He states he is prescribed quetiapine. He denies changes with appetite. He reports noticing some weight loss. He states he is on a consistent schedule with meals due to his job. He states on less structured days he tends to forget to eat. Catrachita Becerra reports depressive sx: isolating a little bit, increased irritability, less energy, and feelings of worthlessness, helplessness, and hopelessness. He reports anxiety at work and after he gets home from work. He reports not having panic attacks in a couple of years. He denies AVH or paranoia. He denies flashbacks from past trauma. Substance Use:  Catrachita Becerra denies alcohol use due to his medication. He denies drug use. His BAL was 0 @ 8:06pm on 2/26/23. His drug screen was positive for amphetamines. He is prescribed Vyvanse.                Functional Achievement:   Catrachita Becerra works at [address] at [employer]. He is a certified surgical technologist. He reports pressure to become certified in certain things and taking responsibility in other areas because people are quitting. He states he has been there for a year. He states he is asked to do other things and doesn't feel adequate. He states people have told him that the way he does things is wrong which makes him feel bad. He states he has been told the way he thinks things is wrong. He states it makes him question if he is doing things right or if he is in the right career. He denies attendance issues. He states his job isn't in jeopardy. He states he's showering less because it comes less to his mind. He states he's not brushing his teeth as much. He states he is completing his household chores \"kind of less. \" He states he has been kind of putting off running errands due to anxiety. Current Treatment and Treatment History:  Therapist: His therapist changed practice without letting him know. The therapist wasn't able to take his insurance at the new practice. He states it's hard to find a therapist who treats OCD, LGBTQ, and other issues. Psychiatrist: Dr. Rafael Tavarez. He states he mainly sees Brian Avila [place]. He last saw her in late Jan. His next appt is a couple months away. He denies recent med changes. He has been taking his medication as prescribed. Outpatient programs: fall and winter of 2021 at [facility] in [place]. Inpatient psychiatric admissions: last admission in 2019 in [facility]. He states he has been inpatient about 6x. Relevant Social History:  Aaron Barroso lives with his mom. He states their relationship is \"good for the most part. \" He states he tried to tell mom how he is feeling and she told him that she \"can't do this anymore. \" He states his support system is his mom and sister. He states he talks with his sister a lot. He states he doesn't have a lot of friends due to social anxiety and a couple other factors.             Sudheer and Complex (as applicable):                                    EDP Assessment (as applicable):  IBW Calculations  Weight: (!) 328 lb 7.8 oz                                                                    Abuse Assessment:  Abuse Assessment  Does anyone say or do something to you that makes you feel unsafe?: No    Mental Status Exam:   General Appearance  Characteristics: Appropriate clothing (hospital gown)  Eye Contact: Direct  Psychomotor Behavior  Gait/Movement: Other (comment) (pt laying on hospital bed)  Abnormal movements: None  Posture: Relaxed  Rate of Movement: Normal  Mood and Affect  Mood or Feelings: Sadness;Depressed;Calm  Appropriateness of Affect: Congruent to mood; Appropriate to situation  Range of Affect: Flat  Stability of Affect: Stable  Attitude toward staff: Co-operative;Open  Speech  Rate of Speech: Appropriate  Flow of Speech: Appropriate  Intensity of Volume: Ordinary  Clarity: Clear  Cognition  Concentration: Unimpaired  Memory: Recent memory intact; Remote memory intact  Orientation Level: Oriented X4  Insight: Poor  Fair/poor insight as evidenced by: William Penny started self harming yesterday and is worried that it will become more intense  Judgment: Poor  Fair/poor judgment as evidenced by: William Penny started self harming yesterday and is worried that it will become more intense  Thought Patterns  Clarity/Relevance: Coherent;Logical;Relevant to topic  Flow: Organized  Content: Ordinary  Level of Consciousness: Alert  Level of Consciousness: Alert  Behavior  Exhibited behavior: Appropriate to situation;Participated      Disposition:    Assessment Summary:   William Penny is a 20 yr old transgender male who arrived to the ER via his mom d/t SIB and passive SI. He reports that he burned himself yesterday and this evening on his left wrist, upper left arm, and left ankle with a metal tongue scraper that he heated up with a lighter. He reports prior to this, he last self harmed 7 yrs ago. He reports [place] a lot of intrusive thoughts that led to me to telling myself to burn myself because I felt I wasn't good enough. \" He states that his self harming wasn't a suicide attempt. He reports thoughts of wishing he were dead and states these thoughts started last night. He reports thoughts at work the past few weeks of wishing he were dead. He states that he \"didn't take those thoughts as seriously as I should have. \" He reports that he's not sure if he is safe to go home. He states that he is worried that \"it could happen again but in a different form\" and more intense. 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 He states that he has pressure to become certified in certain things and he is being asked to take responsibility in other areas because people are quitting. He reports that he is asked to do other things and he doesn't feel adequate. He reports people tell him the way he does things is wrong and tells him the way he thinks is wrong. He reports questioning if he is doing things right and questioning if he is in the right career. He reports trouble with sleep for about 3 weeks. He reports depressive sx's. He reports anxiety at work and when he gets home from work. He reports a decline in ADLs, household chores, and running errands. He denies ETOH or drug use. His BAL was 0 @ 8:06pm on 2/26/23. His drug screen was positive for amphetamines and he is prescribed Vyvanse. He has an outpatient psychiatrist. He doesn't currently have an outpt therapist. He has a hx of IOP/PHP and inpatient psychiatric admissions. He states his last inpatient admission was in [facility] in 2019. Risk/Protective Factors  Protective Factors: He states that he plans on continuing his education and going to nursing school. He has a pet rabbit that means a lot to him and states he's a big part of his life. His father passed away from cancer when he was 4 and states he can't let another death like this happen to his immediate family, especially to his mom.     Level of Care Recommendations  Consulted with: A voicemail was left for [facility] Korin Mcginnis PA-C but no call back at this time. Dr. Jesus Crawford was called and recommended inpatient admission.   Level of Care Recommendation: Inpatient Acute Care  Unit: Adult  Inpatient Criteria: Suicidal/homicidal risk  Behavioral Precautions: Suicide           Diagnoses:  Primary Psychiatric Diagnosis  F33.2 Major depressive disorder, recurrent severe without psychotic features    Secondary Psychiatric Diagnoses  F90.9 Attention-deficit hyperactivity disorder, unspecified type    Pervasive Diagnoses    Pertinent Non-Psychiatric Diagnoses          Miriam Archibald

## 2023-02-27 NOTE — ED NOTES
Writer met with pt, introducing self and role. Pt presents as calm and cooperative. Writer explained and offered available unit resources, including DBT workbook material. Pt declined at this time. Advised to notify staff if in need. Writer to check back with pt.

## 2023-02-27 NOTE — PROGRESS NOTES
02/26/23 2033   COVID Exposure Risk Screening   Do you have any of the following new or worsening symptoms of COVID-19? None   Have you been diagnosed with COVID-19 within the past 10 days? No   Are you awaiting COVID-19 test results or do you have a COVID-19 test scheduled? No   In the past 10 days, have you been in contact with someone who was confirmed or suspected to have COVID-19?  No [No, patient denies ideation or behavior] : No, patient denies ideation or behavior [FreeTextEntry9] : NO RISK TO SELF OR OTHERS ELICITED

## 2023-02-27 NOTE — ED QUICK NOTES
Pt ambulatory to and from restroom with steady gait with this RN. Pt given warm blanket. Pt given turkey sandwich, pretzels, and cup of ice water as requested.

## 2023-02-27 NOTE — ED QUICK NOTES
Pt reports he feels that if he goes home his self harm will get worse. Pt has a history of suicide attempts. Petition completed and placed on chart.

## 2023-02-27 NOTE — ED PROVIDER NOTES
38-year-old male who presented last night for suicidal ideation/self-harm. Medically cleared prior to my arrival.  Has been awaiting placement. Calm with no issues. Accepted to [facility].

## 2023-02-27 NOTE — ED NOTES
Pt requests to wait to see if there is a bed at [facility] tomorrow. JONO MAHARAJ was notified.

## 2023-02-27 NOTE — ED NOTES
Pt was assessed and recommended inpatient admission. [facility] has no beds currently. Pt stated he was open to being transferred to another facility. At this time, [facility] has not given permission to transfer out. Pt was updated on the POC. Pt signed paperwork and received copies.

## 2023-02-28 NOTE — ED QUICK NOTES
Attempted to give nurse to nurse to [facility]. Nurse unable to receive report right now and will call this RN back.

## 2023-03-02 LAB
AMPHETAMINE, URINE: >5000 NG/ML
METAMPHETAMINE, URINE: <200 NG/ML
METHYLENEDIOXYAMPHETAMINE: <200 NG/ML
METHYLENEDIOXYETHYLAMPHETAMINE: <200 NG/ML
METHYLENEDIOXYMETAMPHETAMINE: <200 NG/ML
PHENTERMINE: <200 NG/ML

## 2023-10-10 ENCOUNTER — APPOINTMENT (OUTPATIENT)
Dept: CT IMAGING | Facility: HOSPITAL | Age: 23
End: 2023-10-10
Attending: EMERGENCY MEDICINE

## 2023-10-10 ENCOUNTER — HOSPITAL ENCOUNTER (EMERGENCY)
Facility: HOSPITAL | Age: 23
Discharge: HOME | End: 2023-10-10
Attending: EMERGENCY MEDICINE

## 2023-10-10 ENCOUNTER — APPOINTMENT (OUTPATIENT)
Dept: GENERAL RADIOLOGY | Facility: HOSPITAL | Age: 23
End: 2023-10-10
Attending: EMERGENCY MEDICINE

## 2023-10-10 VITALS
DIASTOLIC BLOOD PRESSURE: 71 MMHG | HEART RATE: 63 BPM | BODY MASS INDEX: 41.08 KG/M2 | TEMPERATURE: 99 F | WEIGHT: 310 LBS | SYSTOLIC BLOOD PRESSURE: 111 MMHG | RESPIRATION RATE: 18 BRPM | OXYGEN SATURATION: 100 % | HEIGHT: 73 IN
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
## 2023-10-10 DIAGNOSIS — S80.01XA CONTUSION OF RIGHT KNEE, INITIAL ENCOUNTER: ICD-10-CM

## 2023-10-10 DIAGNOSIS — S01.521A: ICD-10-CM

## 2023-10-10 DIAGNOSIS — S09.93XA FACIAL INJURY, INITIAL ENCOUNTER: ICD-10-CM

## 2023-10-10 DIAGNOSIS — S02.2XXA CLOSED FRACTURE OF NASAL BONE, INITIAL ENCOUNTER: ICD-10-CM

## 2023-10-10 DIAGNOSIS — S09.90XA CLOSED HEAD INJURY, INITIAL ENCOUNTER: Primary | ICD-10-CM

## 2023-10-10 DIAGNOSIS — S02.5XXA CLOSED FRACTURE OF TOOTH, INITIAL ENCOUNTER: ICD-10-CM

## 2023-10-10 PROCEDURE — 70486 CT MAXILLOFACIAL W/O DYE: CPT | Performed by: EMERGENCY MEDICINE

## 2023-10-10 PROCEDURE — 70450 CT HEAD/BRAIN W/O DYE: CPT | Performed by: EMERGENCY MEDICINE

## 2023-10-10 PROCEDURE — 73562 X-RAY EXAM OF KNEE 3: CPT | Performed by: EMERGENCY MEDICINE

## 2023-10-10 PROCEDURE — 12013 RPR F/E/E/N/L/M 2.6-5.0 CM: CPT

## 2023-10-10 PROCEDURE — 99284 EMERGENCY DEPT VISIT MOD MDM: CPT

## 2023-10-10 RX ORDER — AMOXICILLIN 875 MG/1
875 TABLET, COATED ORAL 2 TIMES DAILY
Qty: 20 TABLET | Refills: 0 | Status: SHIPPED | OUTPATIENT
Start: 2023-10-10 | End: 2023-10-20

## 2023-10-10 RX ORDER — TRAMADOL HYDROCHLORIDE 50 MG/1
100 TABLET ORAL ONCE
Status: COMPLETED | OUTPATIENT
Start: 2023-10-10 | End: 2023-10-10

## 2023-10-10 RX ORDER — LISDEXAMFETAMINE DIMESYLATE CAPSULES 70 MG/1
70 CAPSULE ORAL EVERY MORNING
COMMUNITY

## 2023-10-10 RX ORDER — LIDOCAINE HYDROCHLORIDE 10 MG/ML
1 INJECTION, SOLUTION INFILTRATION; PERINEURAL ONCE
Status: COMPLETED | OUTPATIENT
Start: 2023-10-10 | End: 2023-10-10

## 2023-10-10 RX ORDER — TRAMADOL HYDROCHLORIDE 50 MG/1
TABLET ORAL EVERY 6 HOURS PRN
Qty: 10 TABLET | Refills: 0 | Status: SHIPPED | OUTPATIENT
Start: 2023-10-10 | End: 2023-10-15

## 2023-10-10 RX ORDER — LIDOCAINE HYDROCHLORIDE 10 MG/ML
INJECTION, SOLUTION INFILTRATION; PERINEURAL
Status: COMPLETED
Start: 2023-10-10 | End: 2023-10-10

## 2023-10-10 RX ORDER — TOPIRAMATE 100 MG/1
100 TABLET, FILM COATED ORAL 2 TIMES DAILY
COMMUNITY

## 2023-10-10 NOTE — ED INITIAL ASSESSMENT (HPI)
Patient presents for evaluation of a mechanical fall. He reports that he tripped and fell down approximately 6 steps. C-collar in place. Multiple chipped teeth and mouth laceration noted. Patient denies loss of consciousness and remembers the entire event.

## 2023-10-11 NOTE — DISCHARGE INSTRUCTIONS
Antibiotics as prescribed it is possible it could be a small retained tooth fragment. If pain, pus, redness follow-up with ear nose and throat. Follow-up with your dentist tomorrow. Antibiotics as described. Soft diet.

## 2023-10-21 ENCOUNTER — HOSPITAL ENCOUNTER (EMERGENCY)
Facility: HOSPITAL | Age: 23
Discharge: HOME | End: 2023-10-21
Attending: EMERGENCY MEDICINE
Payer: COMMERCIAL

## 2023-10-21 ENCOUNTER — APPOINTMENT (OUTPATIENT)
Dept: GENERAL RADIOLOGY | Facility: HOSPITAL | Age: 23
End: 2023-10-21
Payer: COMMERCIAL

## 2023-10-21 VITALS
RESPIRATION RATE: 18 BRPM | WEIGHT: 315 LBS | DIASTOLIC BLOOD PRESSURE: 84 MMHG | HEIGHT: 73 IN | BODY MASS INDEX: 41.75 KG/M2 | TEMPERATURE: 97 F | SYSTOLIC BLOOD PRESSURE: 144 MMHG | HEART RATE: 67 BPM | OXYGEN SATURATION: 98 %

## 2023-10-21 DIAGNOSIS — M25.561 ACUTE PAIN OF RIGHT KNEE: Primary | ICD-10-CM

## 2023-10-21 PROCEDURE — 99284 EMERGENCY DEPT VISIT MOD MDM: CPT

## 2023-10-21 PROCEDURE — 73560 X-RAY EXAM OF KNEE 1 OR 2: CPT | Performed by: EMERGENCY MEDICINE

## 2023-10-21 RX ORDER — IBUPROFEN 600 MG/1
600 TABLET ORAL EVERY 8 HOURS PRN
Qty: 30 TABLET | Refills: 0 | Status: SHIPPED | OUTPATIENT
Start: 2023-10-21 | End: 2023-11-13

## 2023-10-21 NOTE — ED INITIAL ASSESSMENT (HPI)
Silvia Brittle over a week ago, c/o R knee pain and swelling. States that able to bend and walk on it but it is extremely painful, also painful to palpation. States that it is a burning sensation.

## 2023-10-24 ENCOUNTER — APPOINTMENT (OUTPATIENT)
Dept: MRI IMAGING | Facility: HOSPITAL | Age: 23
End: 2023-10-24
Attending: EMERGENCY MEDICINE
Payer: COMMERCIAL

## 2023-10-24 ENCOUNTER — HOSPITAL ENCOUNTER (EMERGENCY)
Facility: HOSPITAL | Age: 23
Discharge: HOME | End: 2023-10-24
Attending: EMERGENCY MEDICINE
Payer: COMMERCIAL

## 2023-10-24 VITALS
RESPIRATION RATE: 18 BRPM | WEIGHT: 315 LBS | OXYGEN SATURATION: 97 % | TEMPERATURE: 98 F | SYSTOLIC BLOOD PRESSURE: 120 MMHG | DIASTOLIC BLOOD PRESSURE: 80 MMHG | HEART RATE: 102 BPM | HEIGHT: 73 IN | BODY MASS INDEX: 41.75 KG/M2

## 2023-10-24 DIAGNOSIS — M23.91 INTERNAL DERANGEMENT OF RIGHT KNEE: Primary | ICD-10-CM

## 2023-10-24 PROCEDURE — 99284 EMERGENCY DEPT VISIT MOD MDM: CPT

## 2023-10-24 PROCEDURE — 96375 TX/PRO/DX INJ NEW DRUG ADDON: CPT

## 2023-10-24 PROCEDURE — 96374 THER/PROPH/DIAG INJ IV PUSH: CPT

## 2023-10-24 PROCEDURE — 73721 MRI JNT OF LWR EXTRE W/O DYE: CPT | Performed by: EMERGENCY MEDICINE

## 2023-10-24 RX ORDER — HYDROCODONE BITARTRATE AND ACETAMINOPHEN 5; 325 MG/1; MG/1
2 TABLET ORAL ONCE
Status: COMPLETED | OUTPATIENT
Start: 2023-10-24 | End: 2023-10-24

## 2023-10-24 RX ORDER — HYDROMORPHONE HYDROCHLORIDE 1 MG/ML
1 INJECTION, SOLUTION INTRAMUSCULAR; INTRAVENOUS; SUBCUTANEOUS ONCE
Status: COMPLETED | OUTPATIENT
Start: 2023-10-24 | End: 2023-10-24

## 2023-10-24 RX ORDER — HYDROCODONE BITARTRATE AND ACETAMINOPHEN 5; 325 MG/1; MG/1
1-2 TABLET ORAL EVERY 6 HOURS PRN
Qty: 10 TABLET | Refills: 0 | Status: SHIPPED | OUTPATIENT
Start: 2023-10-24 | End: 2023-11-13

## 2023-10-24 RX ORDER — ONDANSETRON 2 MG/ML
4 INJECTION INTRAMUSCULAR; INTRAVENOUS ONCE
Status: COMPLETED | OUTPATIENT
Start: 2023-10-24 | End: 2023-10-24

## 2023-10-25 NOTE — ED QUICK NOTES
Pt seen here on 10/21 for same and given knee immobilizer and crutches. Pt comes in via ambulance with neither.

## 2023-10-25 NOTE — DISCHARGE INSTRUCTIONS
Discharge to home after MRI is obtained. Results are not required. Ace wrap and crutches weightbearing as tolerated. Follow-up with your orthopedic surgeon by phone tomorrow for MRI results. Statement Selected

## 2023-10-25 NOTE — ED INITIAL ASSESSMENT (HPI)
Pt presents to ed via ems c/o increased pain and swelling to right knee, pt states he injured knee on 10/10 and has outpatient mri tomorrow but states pain is significantly worse, pedal pulses intact bilaterally

## 2023-11-08 ENCOUNTER — HOSPITAL ENCOUNTER (INPATIENT)
Facility: HOSPITAL | Age: 23
LOS: 3 days | Discharge: HOME | End: 2023-11-13
Attending: EMERGENCY MEDICINE | Admitting: HOSPITALIST
Payer: COMMERCIAL

## 2023-11-08 DIAGNOSIS — M70.41 PREPATELLAR BURSITIS OF RIGHT KNEE: ICD-10-CM

## 2023-11-08 DIAGNOSIS — S83.421D TEAR OF LATERAL COLLATERAL LIGAMENT OF RIGHT KNEE, SUBSEQUENT ENCOUNTER: ICD-10-CM

## 2023-11-08 DIAGNOSIS — M71.161 SEPTIC PREPATELLAR BURSITIS OF RIGHT KNEE: ICD-10-CM

## 2023-11-08 LAB
ALBUMIN SERPL-MCNC: 3.8 G/DL (ref 3.4–5)
ALBUMIN/GLOB SERPL: 0.8 {RATIO} (ref 1–2)
ALP LIVER SERPL-CCNC: 137 U/L
ALT SERPL-CCNC: 25 U/L
ANION GAP SERPL CALC-SCNC: 7 MMOL/L (ref 0–18)
AST SERPL-CCNC: 19 U/L (ref 15–37)
BASOPHILS # BLD AUTO: 0.08 X10(3) UL (ref 0–0.2)
BASOPHILS NFR BLD AUTO: 1 %
BILIRUB SERPL-MCNC: 0.1 MG/DL (ref 0.1–2)
BUN BLD-MCNC: 15 MG/DL (ref 9–23)
CALCIUM BLD-MCNC: 9.5 MG/DL (ref 8.5–10.1)
CHLORIDE SERPL-SCNC: 110 MMOL/L (ref 98–112)
CO2 SERPL-SCNC: 22 MMOL/L (ref 21–32)
CREAT BLD-MCNC: 1.33 MG/DL
EGFRCR SERPLBLD CKD-EPI 2021: 76 ML/MIN/1.73M2 (ref 60–?)
EOSINOPHIL # BLD AUTO: 0 X10(3) UL (ref 0–0.7)
EOSINOPHIL NFR BLD AUTO: 0 %
ERYTHROCYTE [DISTWIDTH] IN BLOOD BY AUTOMATED COUNT: 12.1 %
GLOBULIN PLAS-MCNC: 4.6 G/DL (ref 2.8–4.4)
GLUCOSE BLD-MCNC: 120 MG/DL (ref 70–99)
HCT VFR BLD AUTO: 45.5 %
HGB BLD-MCNC: 14.7 G/DL
IMM GRANULOCYTES # BLD AUTO: 0.05 X10(3) UL (ref 0–1)
IMM GRANULOCYTES NFR BLD: 0.6 %
LYMPHOCYTES # BLD AUTO: 1.93 X10(3) UL (ref 1–4)
LYMPHOCYTES NFR BLD AUTO: 24.5 %
MCH RBC QN AUTO: 28.4 PG (ref 26–34)
MCHC RBC AUTO-ENTMCNC: 32.3 G/DL (ref 31–37)
MCV RBC AUTO: 88 FL
MONOCYTES # BLD AUTO: 0.44 X10(3) UL (ref 0.1–1)
MONOCYTES NFR BLD AUTO: 5.6 %
NEUTROPHILS # BLD AUTO: 5.39 X10 (3) UL (ref 1.5–7.7)
NEUTROPHILS # BLD AUTO: 5.39 X10(3) UL (ref 1.5–7.7)
NEUTROPHILS NFR BLD AUTO: 68.3 %
OSMOLALITY SERPL CALC.SUM OF ELEC: 290 MOSM/KG (ref 275–295)
PLATELET # BLD AUTO: 341 10(3)UL (ref 150–450)
POTASSIUM SERPL-SCNC: 4.1 MMOL/L (ref 3.5–5.1)
PROT SERPL-MCNC: 8.4 G/DL (ref 6.4–8.2)
RBC # BLD AUTO: 5.17 X10(6)UL
SODIUM SERPL-SCNC: 139 MMOL/L (ref 136–145)
WBC # BLD AUTO: 7.9 X10(3) UL (ref 4–11)

## 2023-11-08 PROCEDURE — 99222 1ST HOSP IP/OBS MODERATE 55: CPT | Performed by: HOSPITALIST

## 2023-11-08 RX ORDER — ACETAMINOPHEN 325 MG/1
650 TABLET ORAL EVERY 4 HOURS PRN
Status: DISCONTINUED | OUTPATIENT
Start: 2023-11-08 | End: 2023-11-13

## 2023-11-08 RX ORDER — PANTOPRAZOLE SODIUM 20 MG/1
20 TABLET, DELAYED RELEASE ORAL
Status: DISCONTINUED | OUTPATIENT
Start: 2023-11-09 | End: 2023-11-13

## 2023-11-08 RX ORDER — HYDROCODONE BITARTRATE AND ACETAMINOPHEN 5; 325 MG/1; MG/1
1 TABLET ORAL EVERY 4 HOURS PRN
Status: DISCONTINUED | OUTPATIENT
Start: 2023-11-08 | End: 2023-11-13

## 2023-11-08 RX ORDER — PROPRANOLOL HYDROCHLORIDE 20 MG/1
20 TABLET ORAL 2 TIMES DAILY
Status: DISCONTINUED | OUTPATIENT
Start: 2023-11-08 | End: 2023-11-13

## 2023-11-08 RX ORDER — HYDROCODONE BITARTRATE AND ACETAMINOPHEN 5; 325 MG/1; MG/1
2 TABLET ORAL EVERY 4 HOURS PRN
Status: DISCONTINUED | OUTPATIENT
Start: 2023-11-08 | End: 2023-11-13

## 2023-11-08 RX ORDER — PROCHLORPERAZINE EDISYLATE 5 MG/ML
5 INJECTION INTRAMUSCULAR; INTRAVENOUS EVERY 8 HOURS PRN
Status: DISCONTINUED | OUTPATIENT
Start: 2023-11-08 | End: 2023-11-09

## 2023-11-08 RX ORDER — BUPROPION HYDROCHLORIDE 300 MG/1
300 TABLET ORAL DAILY
Status: DISCONTINUED | OUTPATIENT
Start: 2023-11-09 | End: 2023-11-13

## 2023-11-08 RX ORDER — MORPHINE SULFATE 2 MG/ML
1 INJECTION, SOLUTION INTRAMUSCULAR; INTRAVENOUS EVERY 2 HOUR PRN
Status: DISCONTINUED | OUTPATIENT
Start: 2023-11-08 | End: 2023-11-13

## 2023-11-08 RX ORDER — CEFAZOLIN SODIUM/WATER 2 G/20 ML
2 SYRINGE (ML) INTRAVENOUS ONCE
Status: COMPLETED | OUTPATIENT
Start: 2023-11-08 | End: 2023-11-08

## 2023-11-08 RX ORDER — MORPHINE SULFATE 4 MG/ML
4 INJECTION, SOLUTION INTRAMUSCULAR; INTRAVENOUS EVERY 2 HOUR PRN
Status: DISCONTINUED | OUTPATIENT
Start: 2023-11-08 | End: 2023-11-13

## 2023-11-08 RX ORDER — CEFAZOLIN SODIUM/WATER 2 G/20 ML
2 SYRINGE (ML) INTRAVENOUS EVERY 8 HOURS
Status: DISCONTINUED | OUTPATIENT
Start: 2023-11-09 | End: 2023-11-10

## 2023-11-08 RX ORDER — HEPARIN SODIUM 5000 [USP'U]/ML
7500 INJECTION, SOLUTION INTRAVENOUS; SUBCUTANEOUS EVERY 12 HOURS SCHEDULED
Status: DISCONTINUED | OUTPATIENT
Start: 2023-11-08 | End: 2023-11-09

## 2023-11-08 RX ORDER — QUETIAPINE 150 MG/1
150 TABLET, FILM COATED, EXTENDED RELEASE ORAL NIGHTLY
Status: ON HOLD | COMMUNITY
End: 2023-11-08 | Stop reason: CLARIF

## 2023-11-08 RX ORDER — CLOMIPRAMINE HYDROCHLORIDE 50 MG/1
100 CAPSULE ORAL NIGHTLY
COMMUNITY

## 2023-11-08 RX ORDER — ALBUTEROL SULFATE 90 UG/1
1 AEROSOL, METERED RESPIRATORY (INHALATION) EVERY 4 HOURS PRN
Status: DISCONTINUED | OUTPATIENT
Start: 2023-11-08 | End: 2023-11-13

## 2023-11-08 RX ORDER — ENEMA 19; 7 G/133ML; G/133ML
1 ENEMA RECTAL ONCE AS NEEDED
Status: DISCONTINUED | OUTPATIENT
Start: 2023-11-08 | End: 2023-11-09

## 2023-11-08 RX ORDER — ONDANSETRON 2 MG/ML
4 INJECTION INTRAMUSCULAR; INTRAVENOUS EVERY 6 HOURS PRN
Status: DISCONTINUED | OUTPATIENT
Start: 2023-11-08 | End: 2023-11-09

## 2023-11-08 RX ORDER — ACETAMINOPHEN 500 MG
500 TABLET ORAL EVERY 4 HOURS PRN
Status: DISCONTINUED | OUTPATIENT
Start: 2023-11-08 | End: 2023-11-13

## 2023-11-08 RX ORDER — TOPIRAMATE 100 MG/1
100 TABLET, FILM COATED ORAL 2 TIMES DAILY
Status: DISCONTINUED | OUTPATIENT
Start: 2023-11-08 | End: 2023-11-13

## 2023-11-08 RX ORDER — CLOMIPRAMINE HYDROCHLORIDE 50 MG/1
100 CAPSULE ORAL NIGHTLY
Status: DISCONTINUED | OUTPATIENT
Start: 2023-11-08 | End: 2023-11-13

## 2023-11-08 RX ORDER — MORPHINE SULFATE 2 MG/ML
2 INJECTION, SOLUTION INTRAMUSCULAR; INTRAVENOUS EVERY 2 HOUR PRN
Status: DISCONTINUED | OUTPATIENT
Start: 2023-11-08 | End: 2023-11-13

## 2023-11-08 RX ORDER — POLYETHYLENE GLYCOL 3350 17 G/17G
17 POWDER, FOR SOLUTION ORAL DAILY PRN
Status: DISCONTINUED | OUTPATIENT
Start: 2023-11-08 | End: 2023-11-09

## 2023-11-08 RX ORDER — QUETIAPINE FUMARATE 25 MG/1
50 TABLET, FILM COATED ORAL NIGHTLY
Status: DISCONTINUED | OUTPATIENT
Start: 2023-11-08 | End: 2023-11-08

## 2023-11-08 RX ORDER — SENNOSIDES 8.6 MG
17.2 TABLET ORAL NIGHTLY PRN
Status: DISCONTINUED | OUTPATIENT
Start: 2023-11-08 | End: 2023-11-09

## 2023-11-08 RX ORDER — SODIUM CHLORIDE 9 MG/ML
INJECTION, SOLUTION INTRAVENOUS CONTINUOUS
Status: DISCONTINUED | OUTPATIENT
Start: 2023-11-08 | End: 2023-11-09

## 2023-11-08 RX ORDER — BISACODYL 10 MG
10 SUPPOSITORY, RECTAL RECTAL
Status: DISCONTINUED | OUTPATIENT
Start: 2023-11-08 | End: 2023-11-09

## 2023-11-08 RX ORDER — QUETIAPINE 150 MG/1
150 TABLET, FILM COATED, EXTENDED RELEASE ORAL NIGHTLY
Status: DISCONTINUED | OUTPATIENT
Start: 2023-11-08 | End: 2023-11-08

## 2023-11-08 RX ORDER — MELATONIN
3 NIGHTLY PRN
Status: DISCONTINUED | OUTPATIENT
Start: 2023-11-08 | End: 2023-11-13

## 2023-11-08 RX ORDER — QUETIAPINE FUMARATE 100 MG/1
150 TABLET, FILM COATED ORAL NIGHTLY
COMMUNITY

## 2023-11-08 NOTE — ED QUICK NOTES
Orders for admission, patient is aware of plan and ready to go upstairs. Any questions, please call ED RN Nisha Quiroz at extension [phone].      Patient Covid vaccination status: Fully vaccinated     COVID Test Ordered in ED: None    COVID Suspicion at Admission: N/A    Running Infusions:  None    Mental Status/LOC at time of transport: A&Ox4    Other pertinent information:   CIWA score: N/A   NIH score:  N/A

## 2023-11-09 ENCOUNTER — ANESTHESIA (OUTPATIENT)
Dept: SURGERY | Facility: HOSPITAL | Age: 23
End: 2023-11-09
Payer: COMMERCIAL

## 2023-11-09 ENCOUNTER — ANESTHESIA EVENT (OUTPATIENT)
Dept: SURGERY | Facility: HOSPITAL | Age: 23
End: 2023-11-09
Payer: COMMERCIAL

## 2023-11-09 PROCEDURE — 99232 SBSQ HOSP IP/OBS MODERATE 35: CPT | Performed by: HOSPITALIST

## 2023-11-09 PROCEDURE — 0KBQ0ZZ EXCISION OF RIGHT UPPER LEG MUSCLE, OPEN APPROACH: ICD-10-PCS | Performed by: STUDENT IN AN ORGANIZED HEALTH CARE EDUCATION/TRAINING PROGRAM

## 2023-11-09 RX ORDER — DIPHENHYDRAMINE HYDROCHLORIDE 50 MG/ML
12.5 INJECTION INTRAMUSCULAR; INTRAVENOUS AS NEEDED
Status: DISCONTINUED | OUTPATIENT
Start: 2023-11-09 | End: 2023-11-09 | Stop reason: HOSPADM

## 2023-11-09 RX ORDER — SODIUM CHLORIDE, SODIUM LACTATE, POTASSIUM CHLORIDE, CALCIUM CHLORIDE 600; 310; 30; 20 MG/100ML; MG/100ML; MG/100ML; MG/100ML
INJECTION, SOLUTION INTRAVENOUS CONTINUOUS PRN
Status: DISCONTINUED | OUTPATIENT
Start: 2023-11-09 | End: 2023-11-09 | Stop reason: SURG

## 2023-11-09 RX ORDER — TRAMADOL HYDROCHLORIDE 50 MG/1
50 TABLET ORAL ONCE AS NEEDED
Status: DISCONTINUED | OUTPATIENT
Start: 2023-11-09 | End: 2023-11-09 | Stop reason: HOSPADM

## 2023-11-09 RX ORDER — DOCUSATE SODIUM 100 MG/1
100 CAPSULE, LIQUID FILLED ORAL 2 TIMES DAILY
Status: DISCONTINUED | OUTPATIENT
Start: 2023-11-09 | End: 2023-11-13

## 2023-11-09 RX ORDER — LISDEXAMFETAMINE DIMESYLATE CAPSULES 70 MG/1
70 CAPSULE ORAL DAILY
Status: DISCONTINUED | OUTPATIENT
Start: 2023-11-09 | End: 2023-11-13

## 2023-11-09 RX ORDER — SENNOSIDES 8.6 MG
17.2 TABLET ORAL NIGHTLY
Status: DISCONTINUED | OUTPATIENT
Start: 2023-11-09 | End: 2023-11-13

## 2023-11-09 RX ORDER — HYDROMORPHONE HYDROCHLORIDE 1 MG/ML
0.4 INJECTION, SOLUTION INTRAMUSCULAR; INTRAVENOUS; SUBCUTANEOUS EVERY 5 MIN PRN
Status: DISCONTINUED | OUTPATIENT
Start: 2023-11-09 | End: 2023-11-09 | Stop reason: HOSPADM

## 2023-11-09 RX ORDER — ONDANSETRON 2 MG/ML
4 INJECTION INTRAMUSCULAR; INTRAVENOUS EVERY 6 HOURS PRN
Status: DISCONTINUED | OUTPATIENT
Start: 2023-11-09 | End: 2023-11-09 | Stop reason: HOSPADM

## 2023-11-09 RX ORDER — BISACODYL 10 MG
10 SUPPOSITORY, RECTAL RECTAL
Status: DISCONTINUED | OUTPATIENT
Start: 2023-11-09 | End: 2023-11-13

## 2023-11-09 RX ORDER — ONDANSETRON 2 MG/ML
INJECTION INTRAMUSCULAR; INTRAVENOUS AS NEEDED
Status: DISCONTINUED | OUTPATIENT
Start: 2023-11-09 | End: 2023-11-09 | Stop reason: SURG

## 2023-11-09 RX ORDER — ACETAMINOPHEN 500 MG
1000 TABLET ORAL ONCE AS NEEDED
Status: DISCONTINUED | OUTPATIENT
Start: 2023-11-09 | End: 2023-11-09 | Stop reason: HOSPADM

## 2023-11-09 RX ORDER — ENEMA 19; 7 G/133ML; G/133ML
1 ENEMA RECTAL ONCE AS NEEDED
Status: DISCONTINUED | OUTPATIENT
Start: 2023-11-09 | End: 2023-11-13

## 2023-11-09 RX ORDER — ENOXAPARIN SODIUM 100 MG/ML
0.5 INJECTION SUBCUTANEOUS DAILY
Status: DISCONTINUED | OUTPATIENT
Start: 2023-11-10 | End: 2023-11-13

## 2023-11-09 RX ORDER — PROCHLORPERAZINE EDISYLATE 5 MG/ML
5 INJECTION INTRAMUSCULAR; INTRAVENOUS EVERY 8 HOURS PRN
Status: DISCONTINUED | OUTPATIENT
Start: 2023-11-09 | End: 2023-11-13

## 2023-11-09 RX ORDER — HYDROMORPHONE HYDROCHLORIDE 1 MG/ML
0.2 INJECTION, SOLUTION INTRAMUSCULAR; INTRAVENOUS; SUBCUTANEOUS EVERY 5 MIN PRN
Status: DISCONTINUED | OUTPATIENT
Start: 2023-11-09 | End: 2023-11-09 | Stop reason: HOSPADM

## 2023-11-09 RX ORDER — METOPROLOL TARTRATE 5 MG/5ML
2.5 INJECTION INTRAVENOUS ONCE
Status: DISCONTINUED | OUTPATIENT
Start: 2023-11-09 | End: 2023-11-09 | Stop reason: HOSPADM

## 2023-11-09 RX ORDER — ONDANSETRON 2 MG/ML
4 INJECTION INTRAMUSCULAR; INTRAVENOUS EVERY 6 HOURS PRN
Status: DISCONTINUED | OUTPATIENT
Start: 2023-11-09 | End: 2023-11-13

## 2023-11-09 RX ORDER — POLYETHYLENE GLYCOL 3350 17 G/17G
17 POWDER, FOR SOLUTION ORAL DAILY PRN
Status: DISCONTINUED | OUTPATIENT
Start: 2023-11-09 | End: 2023-11-13

## 2023-11-09 RX ORDER — MIDAZOLAM HYDROCHLORIDE 1 MG/ML
1 INJECTION INTRAMUSCULAR; INTRAVENOUS EVERY 5 MIN PRN
Status: DISCONTINUED | OUTPATIENT
Start: 2023-11-09 | End: 2023-11-09 | Stop reason: HOSPADM

## 2023-11-09 RX ORDER — LIDOCAINE HYDROCHLORIDE 40 MG/ML
INJECTION, SOLUTION RETROBULBAR; TOPICAL AS NEEDED
Status: DISCONTINUED | OUTPATIENT
Start: 2023-11-09 | End: 2023-11-09 | Stop reason: SURG

## 2023-11-09 RX ORDER — PROCHLORPERAZINE EDISYLATE 5 MG/ML
5 INJECTION INTRAMUSCULAR; INTRAVENOUS EVERY 8 HOURS PRN
Status: DISCONTINUED | OUTPATIENT
Start: 2023-11-09 | End: 2023-11-09 | Stop reason: HOSPADM

## 2023-11-09 RX ORDER — HYDROMORPHONE HYDROCHLORIDE 1 MG/ML
0.6 INJECTION, SOLUTION INTRAMUSCULAR; INTRAVENOUS; SUBCUTANEOUS EVERY 5 MIN PRN
Status: DISCONTINUED | OUTPATIENT
Start: 2023-11-09 | End: 2023-11-09 | Stop reason: HOSPADM

## 2023-11-09 RX ORDER — CEFAZOLIN SODIUM 1 G/3ML
INJECTION, POWDER, FOR SOLUTION INTRAMUSCULAR; INTRAVENOUS AS NEEDED
Status: DISCONTINUED | OUTPATIENT
Start: 2023-11-09 | End: 2023-11-09 | Stop reason: SURG

## 2023-11-09 RX ORDER — OXYCODONE HYDROCHLORIDE 5 MG/1
5 TABLET ORAL EVERY 4 HOURS PRN
Status: DISCONTINUED | OUTPATIENT
Start: 2023-11-09 | End: 2023-11-09 | Stop reason: HOSPADM

## 2023-11-09 RX ORDER — NALOXONE HYDROCHLORIDE 0.4 MG/ML
80 INJECTION, SOLUTION INTRAMUSCULAR; INTRAVENOUS; SUBCUTANEOUS AS NEEDED
Status: DISCONTINUED | OUTPATIENT
Start: 2023-11-09 | End: 2023-11-09 | Stop reason: HOSPADM

## 2023-11-09 RX ORDER — HYDROMORPHONE HYDROCHLORIDE 1 MG/ML
INJECTION, SOLUTION INTRAMUSCULAR; INTRAVENOUS; SUBCUTANEOUS
Status: COMPLETED
Start: 2023-11-09 | End: 2023-11-09

## 2023-11-09 RX ORDER — HYDRALAZINE HYDROCHLORIDE 20 MG/ML
5 INJECTION INTRAMUSCULAR; INTRAVENOUS
Status: DISCONTINUED | OUTPATIENT
Start: 2023-11-09 | End: 2023-11-09 | Stop reason: HOSPADM

## 2023-11-09 RX ORDER — LABETALOL HYDROCHLORIDE 5 MG/ML
5 INJECTION, SOLUTION INTRAVENOUS EVERY 5 MIN PRN
Status: DISCONTINUED | OUTPATIENT
Start: 2023-11-09 | End: 2023-11-09 | Stop reason: HOSPADM

## 2023-11-09 RX ORDER — KETOROLAC TROMETHAMINE 30 MG/ML
INJECTION, SOLUTION INTRAMUSCULAR; INTRAVENOUS AS NEEDED
Status: DISCONTINUED | OUTPATIENT
Start: 2023-11-09 | End: 2023-11-09 | Stop reason: SURG

## 2023-11-09 RX ORDER — MEPERIDINE HYDROCHLORIDE 25 MG/ML
12.5 INJECTION INTRAMUSCULAR; INTRAVENOUS; SUBCUTANEOUS AS NEEDED
Status: DISCONTINUED | OUTPATIENT
Start: 2023-11-09 | End: 2023-11-09 | Stop reason: HOSPADM

## 2023-11-09 RX ORDER — OXYCODONE HYDROCHLORIDE 5 MG/1
10 TABLET ORAL EVERY 4 HOURS PRN
Status: DISCONTINUED | OUTPATIENT
Start: 2023-11-09 | End: 2023-11-09 | Stop reason: HOSPADM

## 2023-11-09 RX ORDER — SODIUM CHLORIDE, SODIUM LACTATE, POTASSIUM CHLORIDE, CALCIUM CHLORIDE 600; 310; 30; 20 MG/100ML; MG/100ML; MG/100ML; MG/100ML
INJECTION, SOLUTION INTRAVENOUS CONTINUOUS
Status: DISCONTINUED | OUTPATIENT
Start: 2023-11-09 | End: 2023-11-09 | Stop reason: HOSPADM

## 2023-11-09 RX ORDER — DOXEPIN HYDROCHLORIDE 50 MG/1
1 CAPSULE ORAL DAILY
Status: DISCONTINUED | OUTPATIENT
Start: 2023-11-10 | End: 2023-11-13

## 2023-11-09 RX ORDER — METOCLOPRAMIDE HYDROCHLORIDE 5 MG/ML
INJECTION INTRAMUSCULAR; INTRAVENOUS AS NEEDED
Status: DISCONTINUED | OUTPATIENT
Start: 2023-11-09 | End: 2023-11-09 | Stop reason: SURG

## 2023-11-09 RX ORDER — OXYCODONE HYDROCHLORIDE 5 MG/1
15 TABLET ORAL EVERY 4 HOURS PRN
Status: DISCONTINUED | OUTPATIENT
Start: 2023-11-09 | End: 2023-11-09 | Stop reason: HOSPADM

## 2023-11-09 RX ADMIN — METOCLOPRAMIDE HYDROCHLORIDE 10 MG: 5 INJECTION INTRAMUSCULAR; INTRAVENOUS at 17:17:00

## 2023-11-09 RX ADMIN — KETOROLAC TROMETHAMINE 30 MG: 30 INJECTION, SOLUTION INTRAMUSCULAR; INTRAVENOUS at 17:19:00

## 2023-11-09 RX ADMIN — LIDOCAINE HYDROCHLORIDE 4 ML: 40 INJECTION, SOLUTION RETROBULBAR; TOPICAL at 17:19:00

## 2023-11-09 RX ADMIN — ONDANSETRON 4 MG: 2 INJECTION INTRAMUSCULAR; INTRAVENOUS at 18:00:00

## 2023-11-09 RX ADMIN — CEFAZOLIN SODIUM 3 G: 1 INJECTION, POWDER, FOR SOLUTION INTRAMUSCULAR; INTRAVENOUS at 17:22:00

## 2023-11-09 RX ADMIN — SODIUM CHLORIDE, SODIUM LACTATE, POTASSIUM CHLORIDE, CALCIUM CHLORIDE: 600; 310; 30; 20 INJECTION, SOLUTION INTRAVENOUS at 17:15:00

## 2023-11-09 NOTE — PROGRESS NOTES
[facility] Gender Male or Female  Documentation      Sophie Hernandez is a 20year old patient   Legal Sex: male  Gender Identity: transgender male  Sex at Birth: female  Patient is on hormone replacement therapy. This patient does not have an active medication from one of the medication groupers. Patient has been on hormone replacement therapy since 2017. Gender reassignment surgery has not occurred  For the purpose of estimating renal function and medication dosing, this patient will be dosed according to  male based on history of hormone replacement and/or gender reassignment surgery. Pharmacy will continue to follow this patient peripherally and adjust any medications as necessary.     Thank you,   Reta Martin, PharmD  11/8/2023  8:56 PM

## 2023-11-09 NOTE — PLAN OF CARE
Patient A/Ox4 . Vital signs stable , tele with NSR . Right knee swollen , KI in place . Up to bathroom with crutches . Pain is mild and declines need for pain meds . Plan of care updated with patient and family . On iv antibiotics . Plan for OR tomorrow at 1620. NPO after midnight . Safety precautions in place . Reminded to \"call don't fall. Will continue to monitor.

## 2023-11-09 NOTE — PLAN OF CARE
Patient kept NPO for I&D today. Patient denies pain. Patient voiding freely in bathroom. Plan to discharge to home when ready.

## 2023-11-09 NOTE — ANESTHESIA PROCEDURE NOTES
Airway  Date/Time: 11/9/2023 5:20 PM  Urgency: elective    Airway not difficult    General Information and Staff    Patient location during procedure: OR  Anesthesiologist: Sabino Garrido MD  Performed: anesthesiologist   Performed by: Sabino Garrido MD  Authorized by: Sabino Garrido MD      Indications and Patient Condition  Indications for airway management: anesthesia  Sedation level: deep  Preoxygenated: yes  Patient position: sniffing  Mask difficulty assessment: 0 - not attempted    Final Airway Details  Final airway type: supraglottic airway      Successful airway: classic  Size 4       Number of attempts at approach: 1  Number of other approaches attempted: 0    Additional Comments  Easy LMA placement. No evidence of facial, dental, or oral trauma.     Dirk Slade MD  [facility]

## 2023-11-10 PROBLEM — M00.9 PYOGENIC ARTHRITIS OF KNEE (HCC): Status: ACTIVE | Noted: 2023-11-10

## 2023-11-10 LAB
CREAT BLD-MCNC: 1.11 MG/DL
EGFRCR SERPLBLD CKD-EPI 2021: 95 ML/MIN/1.73M2 (ref 60–?)
GLUCOSE BLD-MCNC: 132 MG/DL (ref 70–99)
HCT VFR BLD AUTO: 38.2 %
HGB BLD-MCNC: 12.2 G/DL

## 2023-11-10 PROCEDURE — 99232 SBSQ HOSP IP/OBS MODERATE 35: CPT | Performed by: HOSPITALIST

## 2023-11-10 RX ORDER — VANCOMYCIN HYDROCHLORIDE
15 EVERY 8 HOURS
Status: DISCONTINUED | OUTPATIENT
Start: 2023-11-10 | End: 2023-11-11

## 2023-11-10 RX ORDER — VANCOMYCIN HYDROCHLORIDE
15 EVERY 8 HOURS
Status: DISCONTINUED | OUTPATIENT
Start: 2023-11-10 | End: 2023-11-10 | Stop reason: DRUGHIGH

## 2023-11-10 NOTE — PLAN OF CARE
Received the Patient back from PACU via bed . Alert and awake . Vital signs stable . On room air tele with sinus rhythm . Pain is mild declined pain meds . Dressing clean and dry to right knee , prevena  wound vac intact . Hinged brace in place . Strong pedal pulses . Notified ID consult . On IV antibiotics. Plan of care updated . All safety precautions in place . Reminded to \"call don't fall\" . Will continue to monitor .

## 2023-11-10 NOTE — PHYSICAL THERAPY NOTE
PHYSICAL THERAPY EVALUATION - INPATIENT     Room Number: 355/355-A  Evaluation Date: 11/10/2023  Type of Evaluation: Initial  Physician Order: PT Eval and Treat    Presenting Problem: s/p I & D R thigh 11/9/23  Co-Morbidities : asthma, depression, ADHD  Reason for Therapy: Mobility Dysfunction and Discharge Planning    History related to current admission: Patient is a 20year old adult admitted on 11/8/2023 from home for persistent R knee pain and swelling. Pt s/p I & D R thigh on 11/9/23. Pt initial fall 10/10/23. Pt found to have grade 2 LCL R knee. Pt diagnosed with prepatellar bursitis of R knee. ASSESSMENT   In this PT evaluation, the patient presents with the following impairments increase pain, R hinged knee brace locked in 10 degrees extension, decrease balance, and decrease strength. These impairments and comorbidities manifest themselves as functional limitations in independent bed mobility, transfers, stair negoitiation, and gait. The patient is below baseline and would benefit from skilled inpatient PT to address the above deficits to assist patient in returning to prior to level of function. Functional outcome measures completed include [facility]. The -PAC '6-Clicks' Inpatient Basic Mobility Short Form was completed and this patient is demonstrating a Approx Degree of Impairment: 41.77%  degree of impairment in mobility. Research supports that patients with this level of impairment may benefit from discharge home. DISCHARGE RECOMMENDATIONS  PT Discharge Recommendations: Home    PLAN  PT Treatment Plan: Bed mobility; Energy conservation;Patient education; Family education;Gait training;Neuromuscular re-educate;Balance training;Transfer training;Stair training  Rehab Potential : Good  Frequency (Obs): 3-5x/week  Number of Visits to Meet Established Goals: 2      CURRENT GOALS    Goal #1 Patient is able to demonstrate supine - sit EOB @ level: supervision     Goal #2 Patient is able to demonstrate transfers Sit to/from Stand at assistance level: supervision     Goal #3 Patient is able to ambulate 50 feet with assist device: walker - rolling at assistance level: modified independent     Goal #4 Pt able to ascend/descend 2 steps with CGA and assistive device. Goal #5    Goal #6    Goal Comments: Goals established on 11/10/2023    HOME SITUATION  Type of Home: House   Home Layout: Two level; Able to live on main level  Stairs to Enter : 2  Railing: No  Stairs to Bedroom: 12  Railing: Yes    Lives With: Parent(s)  Drives: Yes  Patient Owned Equipment: Crutches  Patient Regularly Uses: Glasses    Prior Level of [place]: per pt reports, prior to initial fall 10/10/23, pt I with amb without assistive device and no prior falls. Pt has been using B axillary crutches x 2 weeks. 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 Pt lives with mother in 2 story home, but has been and is able to stay on main level. Pt's mother works from home 3days/wk and has a sister that can also come to assist with needs upon discharge. SUBJECTIVE  \"I've been to the bathroom. \"      OBJECTIVE  Precautions: Other (Comment) (R hinged knee brace locked at 10 degrees, wound vac)  Fall Risk: Standard fall risk    WEIGHT BEARING RESTRICTION  Weight Bearing Restriction: R lower extremity        R Lower Extremity: Weight Bearing as Tolerated       PAIN ASSESSMENT  Ratin  Location: R knee  Management Techniques: Repositioning    COGNITION  Overall Cognitive Status:  WFL - within functional limits    RANGE OF MOTION AND STRENGTH ASSESSMENT  Upper extremity ROM and strength are within functional limits     Lower extremity ROM is within functional limits except R knee due to surgery and hinged brace locked at 10 degrees.     Lower extremity strength is within functional limits except R knee due to injury/surgery and not assessed due to hinged brace locked at 10 dgrees      BALANCE  Static Sitting: Fair +  Dynamic Sitting: Fair  Static Standing: Fair  Dynamic Standing: Fair -    ADDITIONAL TESTS                                    ACTIVITY TOLERANCE                         O2 WALK       NEUROLOGICAL FINDINGS  Neurological Findings: Sensation           Sensation: B LE's intact to light touch         AM-PAC '6-Clicks' INPATIENT SHORT FORM - BASIC MOBILITY  How much difficulty does the patient currently have. .. Patient Difficulty: Turning over in bed (including adjusting bedclothes, sheets and blankets)?: None   Patient Difficulty: Sitting down on and standing up from a chair with arms (e.g., wheelchair, bedside commode, etc.): A Little   Patient Difficulty: Moving from lying on back to sitting on the side of the bed?: A Little   How much help from another person does the patient currently need. .. Help from Another: Moving to and from a bed to a chair (including a wheelchair)?: A Little   Help from Another: Need to walk in hospital room?: A Little   Help from Another: Climbing 3-5 steps with a railing?: A Little       AM-PAC Score:  Raw Score: 19   Approx Degree of Impairment: 41.77%   Standardized Score (AM-PAC Scale): 45.44   CMS Modifier (G-Code): CK    FUNCTIONAL ABILITY STATUS  Gait Assessment   Functional Mobility/Gait Assessment  Gait Assistance: Supervision  Distance (ft): 60  Assistive Device: Rolling walker  Pattern: R Decreased stance time (slow, but steady ronda)    Skilled Therapy Provided     Bed Mobility:  Rolling: supervision  Supine to sit: min assist with R LE   Sit to supine: min assist with R LE     Transfer Mobility:  Sit to stand: CGA   Stand to sit: CGA  Gait = pt amb x 60 feet with RW initially with CGA and progressed to SBA. Pt has slow but steady gait. Therapist's Comments: per RN pt ok to be seen. Pt received in bed and agreeable to therapy. Pt mother in room during session. Pt vitals monitored and stable. Pt denies dizziness throughout session. Pt R knee brace in place.   Pt requires assist with R LE during bed mobility. Pt sitting at EOB with R knee ext. Pt height of bed elevated to simulate home environment. Pt requires verbal cues for hand placement during sit to stand from EOB to RW. Pt amb in hallway and returns to room and back to bed. Pt educated on activity/discharge recommendations, call don't fall, ankle pumps for DVT prevention, and questions answered. Pt RN updated. Exercise/Education Provided:  Bed mobility  Energy conservation  Functional activity tolerated  Gait training  Lower therapeutic exercise: Ankle pumps  Transfer training    Patient End of Session: In bed;Needs met;RN aware of session/findings;Call light within reach;SCDs in place; Family present      Patient Evaluation Complexity Level:  History Moderate - 1 or 2 personal factors and/or co-morbidities   Examination of body systems Low - addressing 1-2 elements   Clinical Presentation Low - Stable   Clinical Decision Making Low - Stable       PT Session Time: 30 minutes  Gait Training: 10 minutes

## 2023-11-10 NOTE — BRIEF OP NOTE
Pre-Operative Diagnosis: Septic prepatellar bursitis of right knee [M71.161]     Post-Operative Diagnosis: Septic prepatellar bursitis of right knee [M71.161]      Procedure Performed:   RIGHT KNEE INCISION & DRAINAGE    Surgeon(s) and Role:     Raeann Arango MD - Primary    Assistant(s):        Surgical Findings: fluid collection with multiple septations and obvious fat necrosis     Specimen: tissue x1, culture x2     Estimated Blood Loss: Blood Output: 50 mL (11/9/2023  6:04 PM)      Dictation Number:  Rosibel Cantrell MD  11/9/2023  6:31 PM

## 2023-11-10 NOTE — OPERATIVE REPORT
OPERATIVE REPORT      Facility: [facility]    Patient name: Leo Granados  : 2000        Medical record: [id number]    Date of procedure: 2023    Pre-operative diagnosis: Right leg [place] lesion with infection    Post-operative diagnosis: Same    Procedure performed: Irrigation and debridement right distal thigh down to muscle and fascia (CPT 68702)    Implants: none    Surgeon: Briseida Aleman M.D. Assistant(s):  1. none    Anesthesia: General anesthesia      Estimated blood loss: 50 milliliters     Drains: Provena incisional vac    Specimens: tissue and fluid sent for cultures    Complications: None apparent      Indications: Leo Granados is a pleasant 20year old year old who presented with a right distal thigh pain and swelling. Infection workup including aspiration concerning for seeding of closed degloving injury to distal thigh. We discussed operative intervention to clear the infection. We discussed the risks of the procedure which include, but are not limited to, infection, DVT, PE, damage to nerves or blood vessels at the time of surgery, bleeding and blood loss, stiffness/arthrofibrosis, damage to extensor mechanism, continued pain, the possibility of knee arthrosis, and the risks of anesthesia. The patient expressed understanding and wished to proceed with the surgery. After a through discussion with the patient and their family, the decision was made to undergo an irrigation and debridement. An informed consent form was signed and placed on the chart prior to coming to the Operating Room. Description of Procedure: The patient was identified in the preop holding area and the right knee was marked as the correct operative site. The patient was given a dose of perioperative antibiotics and then brought to the Operating Room and placed supine on the operating table. The operative extremity was then prepped and draped in standard sterile fashion.  A surgical time out was performed confirming that the right knee was the correct operative site. An lateral thigh incision was utilized. Dissection was carried down through the skin and subcutaneous tissue. The fluid pocket was immediate entered above the fascia and drained. Wound culture swabs x2 were obtained. Further debridement was carried out of necrotic fat, subcutaneous tissue, and underlying fascia and muscle. The area of debridement was 99x43iy. Copious irrigation was undergone with 6 liters of normal saline. Using PDS sutures, the deep tissue was approximated to close the dead space. At this point, the wound was thoroughly irrigated. Hemostasis was obtained with an electrocautery device. The skin was closed using nylon in vertical mattress pattern. An incisional wound vac was applied with appropriate suction. Cast padding and ace wrap. Hinged knee brace applied. The patient was then awakened from anesthesia and transferred to the Recovery Room in stable condition. Will continue IV abx per ID.

## 2023-11-10 NOTE — PLAN OF CARE
Alert and oriented x 4. Vitals stable on room air. Pain 3/10, controlled on oral medication. Denies numbness or tingling. Dressing to right knee clean, dry, intact, covered with ace wrap. Voiding without difficulty. Last BM 11/08/2023. Tolerating regular diet. Pt up with PT, one assist and rolling walker. Awaiting wound cultures, antibiotics being given as ordered. Plan of care discussed with patient, verbalized understanding.

## 2023-11-11 LAB
CREAT BLD-MCNC: 0.99 MG/DL
EGFRCR SERPLBLD CKD-EPI 2021: 108 ML/MIN/1.73M2 (ref 60–?)
HBV SURFACE AB SER QL: NONREACTIVE
HBV SURFACE AB SERPL IA-ACNC: 3.26 MIU/ML
HCV AB SERPL QL IA: NONREACTIVE
VANCOMYCIN PEAK SERPL-MCNC: 35.9 UG/ML (ref 30–50)
VANCOMYCIN TROUGH SERPL-MCNC: 25 UG/ML (ref 10–20)

## 2023-11-11 PROCEDURE — 99232 SBSQ HOSP IP/OBS MODERATE 35: CPT | Performed by: HOSPITALIST

## 2023-11-11 RX ORDER — VANCOMYCIN HYDROCHLORIDE
15 EVERY 12 HOURS
Status: DISCONTINUED | OUTPATIENT
Start: 2023-11-12 | End: 2023-11-12 | Stop reason: SDUPTHER

## 2023-11-11 NOTE — PHYSICAL THERAPY NOTE
PHYSICAL THERAPY TREATMENT NOTE - INPATIENT    Room Number: 355/355-A     Session: 1     Number of Visits to Meet Established Goals: 2    Presenting Problem: s/p I & D R thigh 23  Co-Morbidities : asthma, depression, ADHD  ASSESSMENT     Patient currently does not meet criteria for skilled inpatient physical therapy services, however patient will continue to benefit from QID ambulation with nursing staff. Pt is hereby discharged from  [address] RN aware to re-order if there is a decline in mobility status. DISCHARGE RECOMMENDATIONS  PT Discharge Recommendations: Home     PLAN  PT Treatment Plan: Bed mobility; Energy conservation;Patient education; Family education;Gait training;Neuromuscular re-educate;Balance training;Transfer training;Stair training  Rehab Potential : Good  Frequency (Obs): 3-5x/week    CURRENT GOALS     Goal #1 Patient is able to demonstrate supine - sit EOB @ level: supervision      Goal #2 Patient is able to demonstrate transfers Sit to/from Stand at assistance level: supervision      Goal #3 Patient is able to ambulate 50 feet with assist device: walker - rolling at assistance level: modified independent      Goal #4 Pt able to ascend/descend 2 steps with CGA and assistive device. Goal #5     Goal #6     Goal Comments: Goals established on 11/10/2023    2023 all goals achieved       SUBJECTIVE  \"I am doing great, ready to go! \"    OBJECTIVE  Precautions:  Other (Comment) (R hinged knee brace locked at 10 degrees, wound vac)    WEIGHT BEARING RESTRICTION  Weight Bearing Restriction: R lower extremity        R Lower Extremity: Weight Bearing as Tolerated       PAIN ASSESSMENT   Ratin  Location: R knee  Management Techniques: Repositioning    BALANCE                                                                                                                       Static Sitting: Fair +  Dynamic Sitting: Fair           Static [address]  Dynamic Standin [address] -    ACTIVITY TOLERANCE                         O2 WALK         AM-PAC '6-Clicks' INPATIENT SHORT FORM - BASIC MOBILITY  How much difficulty does the patient currently have. .. Patient Difficulty: Turning over in bed (including adjusting bedclothes, sheets and blankets)?: None   Patient Difficulty: Sitting down on and standing up from a chair with arms (e.g., wheelchair, bedside commode, etc.): A Little   Patient Difficulty: Moving from lying on back to sitting on the side of the bed?: A Little   How much help from another person does the patient currently need. .. Help from Another: Moving to and from a bed to a chair (including a wheelchair)?: A Little   Help from Another: Need to walk in hospital room?: A Little   Help from Another: Climbing 3-5 steps with a railing?: A Little       AM-PAC Score:  Raw Score: 19   Approx Degree of Impairment: 41.77%   Standardized Score (AM-PAC Scale): 45.44   CMS Modifier (G-Code): CK    FUNCTIONAL ABILITY STATUS  Gait Assessment   Functional Mobility/Gait Assessment  Gait Assistance: Supervision  Distance (ft): 200  Assistive Device: Rolling walker  Pattern: R Decreased stance time (slow, but steady ronda)  Stairs: Stairs  How Many Stairs: 8  Device: 2 Rails (Pt instructed with use of single railing and crutch)  Assist: Supervision  Pattern: Ascend and Descend  Ascend and Descend : Step to    Skilled Therapy Provided    Bed Mobility:  Rolling: IND   Supine<>Sit: Mod I   Sit<>Supine: NT     Transfer Mobility:  Sit<>Stand: Mod I   Stand<>Sit: Mod I   Gait: SBA    Therapist's Comments: completed stairs - agreeable to utilize RW at dc. Pt's mother at bedside. Denied further questions. THERAPEUTIC EXERCISES  Lower Extremity Ankle pumps     Upper Extremity  - open/close     Position Sitting     Repetitions   10   Sets   1     Patient End of Session: Up in chair;Needs met;Call light within reach;RN aware of session/findings; All patient questions and concerns addressed; Alarm set    PT Session Time: 15 minutes  Gait Training: 10 minutes  Therapeutic Activity: 0 minutes  Therapeutic Exercise: 0 minutes   Neuromuscular Re-education: 0 minutes

## 2023-11-11 NOTE — PLAN OF CARE
A&Ox4 . VSS. On RA. . IS encouraged. Telemetry monitoring NSR. SCDs. Ankle pumps encouraged. Tolerating diet. Last BM . Voiding. Pain managed with po [place]. Dressing to R Knee C/D/I. Prevena wound vac in place. Hinge brace on locked at 10 degrees. Ambulating with  assist. Await cultures, PICC line to be ordered per ID. IV Vanco and Zosyn. Patient updated and in agreement with plan of care. Safety precautions in place. Instructed patient to call for assistance, call light within reach. Savita Pedro removed per plastic surgery- dressing changed.

## 2023-11-11 NOTE — PLAN OF CARE
Patient A&O X4 on RA. VSS, /IS/telemetry- NSR. SCDs to LLE, Lovenox. Voiding freely via bathroom, LBM 11/10. On IV Vacno Q8h and IV Zosyn Q8h. Surgical incision to right knee with wound vac in place. Hinged brace in place to RLE. Denies n/t. Pain controlled with PO medication. WBAT. PT/OT. Up min assist w/ walker. Reminded to use call light. Plan to dc home when cleared.

## 2023-11-12 LAB
CREAT BLD-MCNC: 1.1 MG/DL
EGFRCR SERPLBLD CKD-EPI 2021: 96 ML/MIN/1.73M2 (ref 60–?)

## 2023-11-12 PROCEDURE — 99232 SBSQ HOSP IP/OBS MODERATE 35: CPT | Performed by: HOSPITALIST

## 2023-11-12 NOTE — PLAN OF CARE
Patient A&O X4 on RA. VSS, /IS/telemetry- NSR. SCDs to LLE Lovenox. Voiding freely via bathroom, LBM 11/10. On IV Vanco Q12h and IV Zosyn Q8h. Surgical incision to right knee with dressing in place, c/d/i. Hinged brace in place to RLE. Denies n/t. Pain controlled with PO medication. WBAT. PT/OT. Up min assist w/ walker. Reminded to use call light. Plan to dc home when cleared with possible IV antibiotics.

## 2023-11-12 NOTE — PLAN OF CARE
Alert and oriented x 4. Vitals stable on room. Reports tolerable right knee pain. Ace wrap dressing intact, brace in place to RLE. Voiding without difficulty. Reports last BM was 11/11/23. Tolerating regular diet. Up WBAT with walker. PICC line tomorrow. Per [id number] Kevin Brar RN, plastics MD was at bedside yesterday and removed Provena vac. Plan of care discussed with patient.

## 2023-11-13 VITALS
SYSTOLIC BLOOD PRESSURE: 121 MMHG | BODY MASS INDEX: 42 KG/M2 | WEIGHT: 315 LBS | RESPIRATION RATE: 16 BRPM | DIASTOLIC BLOOD PRESSURE: 68 MMHG | HEART RATE: 79 BPM | OXYGEN SATURATION: 98 % | TEMPERATURE: 98 F

## 2023-11-13 LAB
CREAT BLD-MCNC: 0.9 MG/DL
EGFRCR SERPLBLD CKD-EPI 2021: 122 ML/MIN/1.73M2 (ref 60–?)

## 2023-11-13 PROCEDURE — 99239 HOSP IP/OBS DSCHRG MGMT >30: CPT | Performed by: HOSPITALIST

## 2023-11-13 PROCEDURE — 02HV33Z INSERTION OF INFUSION DEVICE INTO SUPERIOR VENA CAVA, PERCUTANEOUS APPROACH: ICD-10-PCS | Performed by: HOSPITALIST

## 2023-11-13 RX ORDER — LIDOCAINE HYDROCHLORIDE 10 MG/ML
5 INJECTION, SOLUTION EPIDURAL; INFILTRATION; INTRACAUDAL; PERINEURAL
Status: COMPLETED | OUTPATIENT
Start: 2023-11-13 | End: 2023-11-13

## 2023-11-13 RX ORDER — ASPIRIN 325 MG
325 TABLET ORAL DAILY
Qty: 21 TABLET | Refills: 0 | Status: SHIPPED | OUTPATIENT
Start: 2023-11-13 | End: 2023-12-04

## 2023-11-13 NOTE — CM/SW NOTE
Received call from [facility] with [facility] who confirmed pt covered at 100%. She will contact pt/family directly to complete arrangements for infusion services with teaching and weekly labs/line care at their [place] [address]. Pt will receive call by end of day today with appointment details for teaching visit tomorrow. Updated RN. / to remain available for support and/or discharge planning.      Agatha Culp, [facility]  Discharge Planner  [phone]

## 2023-11-13 NOTE — PROGRESS NOTES
NURSING DISCHARGE NOTE    Discharged Home via Wheelchair. Accompanied by Family member  Belongings Taken by patient/family. Home medication returned to patient from pharmacy. Peripheral IV removed. Discharge instructions reviewed. Patient discharge home with crutches and walker.

## 2023-11-13 NOTE — PLAN OF CARE
Alert and Oriented x4. RA. VSS. Up x1 with standby assist and walker. Hinge knee brace in place to RLE. Ace Wrap in place, C/D/I. PICC line in place to RUE, dressing C/D/I. Tolerating diet. Voiding freely.     Plan: [place] prior to discharge

## 2023-11-13 NOTE — PLAN OF CARE
Pt a/ox4. Rates pain minimally when assessed. Up with standby assist and walker. Knee brace adjusted, repositioned in proper alignment with joint. Added additional padding to knee over incision site per pt request.   Continuing IV abx as ordered. PICC line to be placed 11/13/23 with probable discharge to home on [place].

## 2023-11-13 NOTE — DISCHARGE INSTRUCTIONS
You are scheduled to receive IV antibiotics teaching, weekly PICC line care and lab draws at the [facility] ambulatory infusion suite:  [address], [address] [facility], [address] [place] [address] first appointment is on 11/14/2023. You will receive a call with your appointment details. - Continue weight bearing as tolerated with your hinged knee brace in place  - Dry dressing change to be done as needed to the surgical site.  (Gauze/tape)

## 2023-11-13 NOTE — CM/SW NOTE
11/13/23 1100   CM/SW Referral Data   Referral Source Social Work (self-referral)   Reason for Referral Discharge planning   Informant Patient; Mother;Clinical Staff Member;EMR   Patient Info   Patient's Current Mental Status at Time of Assessment Alert;Oriented   Patient Status Prior to Admission   Independent with ADLs and Mobility Yes   Discharge Needs   Anticipated D/C needs Infusion care       Patient is a 21 y/o man admitted with prepatellar bursitis of right knee now s/p I&D. Per ID, pt will require IV abx at DC. PT recommending home. Referral sent to [facility] via [address]. Met with pt/mother at bedside to discuss DC planning. Reviewed need for IV abx and discussed options for outpatient vs home infusion. 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 Pt's mother supportive and willing to drive pt to weekly ambulatory center for labs/line care. Plan for pt to receive IV abx teaching and weekly labs/line care through [facility]. Pt will administer IV abx independently at home. He is aware that he will not receive [place] [address] services and confirmed he does not feel HH is needed. Anticipate DC today once [facility] confirms approval/acceptance. Pt/mother agreeable with plan. Spoke with Bernabe Knapp from [place] regarding [address] as above. She will schedule pt for teaching at their [place] [address] for tomorrow. Await confirmation of insurance approval/coverage. / to remain available for support and/or discharge planning.      Polo Kimbrough [facility]  Discharge Planner  [phone]

## 2023-11-14 NOTE — PAYOR COMM NOTE
Discharge Notification    Patient Name: Maximilian Leonard: [address] [facility] #: [id number]  Authorization Number: [id number]  Admit Date/Time: 11/8/2023 4:05 PM  Discharge Date/Time: 11/13/2023 2:45 PM

## 2023-11-20 NOTE — CDS QUERY
Clarification of Procedure - Debridement  Jeanette Dixon,     Clinical information (provided below) indicates that a debridement was done on 11/9/23. For accurate ICD-10-PCS code assignment, please clarify the type of the debridement that was done. Clarification: TYPE of Procedure  (   )  Excisional Debridement (surgical removal or cutting away of devitalized tissue ,necrosis, or slough to the level of viable tissue)     (   )  Non Excisional Debridement  (non-operative scraping, brushing, irrigating, scrubbing, or washing of devitalized tissue, necrosis, or slough; minor removal of loose fragments)    _________________________________________________________________________________________________________________________________________________________________________________________________________________________________________________________________________________________________________________________________________________________________________________________________________________________________________________________________________________________________  Documentation from the Medical Record:     10/20 Operative Report:   Procedure Performed:  Irrigation and debridement right distal thigh down to muscle and fascia   An lateral thigh incision was utilized. Dissection was carried down through the skin and subcutaneous tissue. The fluid pocket was immediate entered above the fascia and drained. Wound culture swabs x2 were obtained. Further debridement was carried out of necrotic fat, subcutaneous tissue, and underlying fascia and muscle. The area of debridement was 60x64kc. Copious irrigation was undergone with 6 liters of normal saline. Using PDS sutures, the deep tissue was approximated to close the dead space. At this point, the wound was thoroughly irrigated. Hemostasis was obtained with an electrocautery device.  The skin was closed using nylon in vertical mattress pattern. An incisional wound vac was applied with appropriate suction. Cast padding and ace wrap. Hinged knee brace applied. For questions regarding this query, please contact Clinical :   Fabi Lewis RN     Cell# [phone]  Thank you.

## 2023-11-20 NOTE — CDS QUERY
Clarification of Procedure - Debridement  Juan J Mayorga,     Clinical information (provided below) indicates that a debridement was done on 11/9/23. For accurate ICD-10-PCS code assignment, please clarify the type of the debridement that was done. Clarification: TYPE of Procedure  ( x )  Excisional Debridement (surgical removal or cutting away of devitalized tissue ,necrosis, or slough to the level of viable tissue)      (   )  Non Excisional Debridement  (non-operative scraping, brushing, irrigating, scrubbing, or washing of devitalized tissue, necrosis, or slough; minor removal of loose fragments)    _________________________________________________________________________________________________________________________________________________________________________________________________________________________________________________________________________________________________________________________________________________________________________________________________________________________________________________________________________________________________  Documentation from the Medical Record:     10/20 Operative Report:   Procedure Performed:  Irrigation and debridement right distal thigh down to muscle and fascia   An lateral thigh incision was utilized. Dissection was carried down through the skin and subcutaneous tissue. The fluid pocket was immediate entered above the fascia and drained. Wound culture swabs x2 were obtained. Further debridement was carried out of necrotic fat, subcutaneous tissue, and underlying fascia and muscle. The area of debridement was [id number]. Copious irrigation was undergone with 6 liters of normal saline. Using PDS sutures, the deep tissue was approximated to close the dead space. At this point, the wound was thoroughly irrigated. Hemostasis was obtained with an electrocautery device.  The skin was closed using nylon in vertical mattress pattern. An incisional wound vac was applied with appropriate suction. Cast padding and ace wrap. Hinged knee brace applied. For questions regarding this query, please contact Clinical :   Kinjal Cleary RN     Cell# [phone]  Thank you.

## (undated) DEVICE — SUTURE CHROMIC GUT 2-0 SH

## (undated) DEVICE — SOL  .9 1000ML BTL

## (undated) DEVICE — SPONGE STICK WITH PVP-I: Brand: KENDALL

## (undated) DEVICE — PAD SACRAL SPAN AID

## (undated) DEVICE — MINI LAP PACK-LF: Brand: MEDLINE INDUSTRIES, INC.

## (undated) DEVICE — SUTURE ETHILON 2-0 FS

## (undated) DEVICE — KIT INCIS MGMT 13CM PEEL AND PLC DISP PREVENA

## (undated) DEVICE — PAD ULNAR NERVE PROTECTOR

## (undated) DEVICE — STRL PENROSE DRAIN 18" X 1/4": Brand: CARDINAL HEALTH

## (undated) DEVICE — STERILE POLYISOPRENE POWDER-FREE SURGICAL GLOVES: Brand: PROTEXIS

## (undated) DEVICE — SCD SLEEVE KNEE HI BLEND

## (undated) DEVICE — GAUZE SPONGES,USP TYPE VII GAUZE, 12 PLY: Brand: CURITY

## (undated) NOTE — LETTER
Date & Time: 2/18/2019, 5:18 PM  Patient: Rachel Watson  Encounter Provider(s):    May Howell MD       To Whom It May Concern:    Sandra Briggs was seen and treated in our department on 2/18/2019. Please excuse patient for today.     If you have any

## (undated) NOTE — ED AVS SNAPSHOT
Machelle Mayo   MRN: [id number]    Department:  [facility] Emergency Department   Date of Visit:  1/21/2019           Disclosure     Insurance plans vary and the physician(s) referred by the ER may not be covered by your plan.  Please contact you tell this physician (or your personal doctor if your instructions are to return to your personal doctor) about any new or lasting problems. The primary care or specialist physician will see patients referred from the [facility] Emergency Department.  Salvatore Rizzo

## (undated) NOTE — LETTER
Shirin Lee 89  [address] [facility]  Authorization for Surgical Operation and Procedure     Date:___________                                                                                                         Time:__________ potential risks that can occur: fever and allergic reactions, hemolytic reactions, transmission of diseases such as Hepatitis, AIDS and Cytomegalovirus (CMV) and fluid overload.   In the event that I wish to have an autologous transfusion of my own blood, o will determine when the applicable recovery period ends for purposes of reinstating the DNAR order.   10. Patients having a sterilization procedure: I understand that if the procedure is successful the results will be permanent and it will therefore be impo (anesthesia doctor) to give me medicine and do additional procedures as necessary.  Some examples are: Starting or using an “IV” to give me medicine, fluids or blood during my procedure, and having a breathing tube placed to help me breathe when I’m asleep blocks”): I understand that rare but potential complications include headache, bleeding, infection, seizure, irregular heart rhythms, and nerve injury.     I can change my mind about having anesthesia services at any time before I get the medicine.    ____

## (undated) NOTE — ED AVS SNAPSHOT
Kiki Del Angel   MRN: [id number]    Department:  [facility] Emergency Department   Date of Visit:  2/18/2019           Disclosure     Insurance plans vary and the physician(s) referred by the ER may not be covered by your plan.  Please contact you tell this physician (or your personal doctor if your instructions are to return to your personal doctor) about any new or lasting problems. The primary care or specialist physician will see patients referred from the [facility] Emergency Department.  Nate Cook